# Patient Record
Sex: MALE | Race: WHITE | NOT HISPANIC OR LATINO | Employment: OTHER | URBAN - METROPOLITAN AREA
[De-identification: names, ages, dates, MRNs, and addresses within clinical notes are randomized per-mention and may not be internally consistent; named-entity substitution may affect disease eponyms.]

---

## 2023-06-12 DIAGNOSIS — I25.10 CORONARY ARTERY DISEASE INVOLVING NATIVE CORONARY ARTERY OF NATIVE HEART WITHOUT ANGINA PECTORIS: ICD-10-CM

## 2023-06-12 RX ORDER — CLOPIDOGREL BISULFATE 75 MG/1
TABLET ORAL
Qty: 90 TABLET | Refills: 0 | Status: SHIPPED | OUTPATIENT
Start: 2023-06-12 | End: 2023-09-08 | Stop reason: SDUPTHER

## 2023-08-02 LAB
ANION GAP IN SER/PLAS: 11 MMOL/L (ref 10–20)
CALCIUM (MG/DL) IN SER/PLAS: 9 MG/DL (ref 8.6–10.3)
CARBON DIOXIDE, TOTAL (MMOL/L) IN SER/PLAS: 28 MMOL/L (ref 21–32)
CHLORIDE (MMOL/L) IN SER/PLAS: 108 MMOL/L (ref 98–107)
CREATININE (MG/DL) IN SER/PLAS: 1.19 MG/DL (ref 0.5–1.3)
ERYTHROCYTE DISTRIBUTION WIDTH (RATIO) BY AUTOMATED COUNT: 15.5 % (ref 11.5–14.5)
ERYTHROCYTE MEAN CORPUSCULAR HEMOGLOBIN CONCENTRATION (G/DL) BY AUTOMATED: 31.4 G/DL (ref 32–36)
ERYTHROCYTE MEAN CORPUSCULAR VOLUME (FL) BY AUTOMATED COUNT: 91 FL (ref 80–100)
ERYTHROCYTES (10*6/UL) IN BLOOD BY AUTOMATED COUNT: 4.31 X10E12/L (ref 4.5–5.9)
GFR MALE: 62 ML/MIN/1.73M2
GLUCOSE (MG/DL) IN SER/PLAS: 105 MG/DL (ref 74–99)
HEMATOCRIT (%) IN BLOOD BY AUTOMATED COUNT: 39.2 % (ref 41–52)
HEMOGLOBIN (G/DL) IN BLOOD: 12.3 G/DL (ref 13.5–17.5)
LEUKOCYTES (10*3/UL) IN BLOOD BY AUTOMATED COUNT: 5.1 X10E9/L (ref 4.4–11.3)
MAGNESIUM (MG/DL) IN SER/PLAS: 1.8 MG/DL (ref 1.6–2.4)
NATRIURETIC PEPTIDE B (PG/ML) IN SER/PLAS: 94 PG/ML (ref 0–99)
PLATELETS (10*3/UL) IN BLOOD AUTOMATED COUNT: 155 X10E9/L (ref 150–450)
POTASSIUM (MMOL/L) IN SER/PLAS: 4.8 MMOL/L (ref 3.5–5.3)
SODIUM (MMOL/L) IN SER/PLAS: 142 MMOL/L (ref 136–145)
UREA NITROGEN (MG/DL) IN SER/PLAS: 18 MG/DL (ref 6–23)

## 2023-08-07 ENCOUNTER — APPOINTMENT (OUTPATIENT)
Dept: PRIMARY CARE | Facility: CLINIC | Age: 78
End: 2023-08-07
Payer: MEDICARE

## 2023-08-28 ENCOUNTER — OFFICE VISIT (OUTPATIENT)
Dept: PRIMARY CARE | Facility: CLINIC | Age: 78
End: 2023-08-28
Payer: MEDICARE

## 2023-08-28 VITALS
SYSTOLIC BLOOD PRESSURE: 130 MMHG | WEIGHT: 177.8 LBS | HEART RATE: 51 BPM | TEMPERATURE: 97.9 F | HEIGHT: 70 IN | OXYGEN SATURATION: 96 % | RESPIRATION RATE: 16 BRPM | BODY MASS INDEX: 25.45 KG/M2 | DIASTOLIC BLOOD PRESSURE: 64 MMHG

## 2023-08-28 DIAGNOSIS — I44.1 SECOND DEGREE HEART BLOCK BY ELECTROCARDIOGRAM (ECG): ICD-10-CM

## 2023-08-28 DIAGNOSIS — Z00.00 MEDICARE ANNUAL WELLNESS VISIT, SUBSEQUENT: Primary | ICD-10-CM

## 2023-08-28 DIAGNOSIS — I10 BENIGN ESSENTIAL HTN: ICD-10-CM

## 2023-08-28 DIAGNOSIS — I48.3 TYPICAL ATRIAL FLUTTER (MULTI): ICD-10-CM

## 2023-08-28 DIAGNOSIS — I50.42 CHRONIC COMBINED SYSTOLIC AND DIASTOLIC HEART FAILURE, NYHA CLASS 3 (MULTI): ICD-10-CM

## 2023-08-28 DIAGNOSIS — Z00.00 ROUTINE GENERAL MEDICAL EXAMINATION AT HEALTH CARE FACILITY: ICD-10-CM

## 2023-08-28 DIAGNOSIS — R97.20 ELEVATED PSA: ICD-10-CM

## 2023-08-28 DIAGNOSIS — Z86.73 HISTORY OF CVA (CEREBROVASCULAR ACCIDENT): ICD-10-CM

## 2023-08-28 DIAGNOSIS — R91.1 PULMONARY NODULE: ICD-10-CM

## 2023-08-28 DIAGNOSIS — I25.10 ASHD (ARTERIOSCLEROTIC HEART DISEASE): ICD-10-CM

## 2023-08-28 DIAGNOSIS — K21.9 GASTROESOPHAGEAL REFLUX DISEASE WITHOUT ESOPHAGITIS: ICD-10-CM

## 2023-08-28 DIAGNOSIS — Z95.1 S/P CABG X 2: ICD-10-CM

## 2023-08-28 DIAGNOSIS — D50.0 IRON DEFICIENCY ANEMIA DUE TO CHRONIC BLOOD LOSS: ICD-10-CM

## 2023-08-28 DIAGNOSIS — E78.2 MIXED HYPERLIPIDEMIA: ICD-10-CM

## 2023-08-28 DIAGNOSIS — E11.9 DIET-CONTROLLED DIABETES MELLITUS (MULTI): ICD-10-CM

## 2023-08-28 PROBLEM — Z98.890 HISTORY OF MITRAL VALVE REPAIR: Status: ACTIVE | Noted: 2023-08-28

## 2023-08-28 PROBLEM — R35.1 NOCTURIA: Status: ACTIVE | Noted: 2023-08-28

## 2023-08-28 PROBLEM — E78.5 HYPERLIPIDEMIA: Status: ACTIVE | Noted: 2023-08-28

## 2023-08-28 PROCEDURE — 1126F AMNT PAIN NOTED NONE PRSNT: CPT | Performed by: FAMILY MEDICINE

## 2023-08-28 PROCEDURE — 1170F FXNL STATUS ASSESSED: CPT | Performed by: FAMILY MEDICINE

## 2023-08-28 PROCEDURE — 1036F TOBACCO NON-USER: CPT | Performed by: FAMILY MEDICINE

## 2023-08-28 PROCEDURE — 3075F SYST BP GE 130 - 139MM HG: CPT | Performed by: FAMILY MEDICINE

## 2023-08-28 PROCEDURE — 3078F DIAST BP <80 MM HG: CPT | Performed by: FAMILY MEDICINE

## 2023-08-28 PROCEDURE — 1159F MED LIST DOCD IN RCRD: CPT | Performed by: FAMILY MEDICINE

## 2023-08-28 PROCEDURE — G0439 PPPS, SUBSEQ VISIT: HCPCS | Performed by: FAMILY MEDICINE

## 2023-08-28 PROCEDURE — 1160F RVW MEDS BY RX/DR IN RCRD: CPT | Performed by: FAMILY MEDICINE

## 2023-08-28 RX ORDER — BLOOD SUGAR DIAGNOSTIC
STRIP MISCELLANEOUS
COMMUNITY
Start: 2021-03-08 | End: 2023-08-28 | Stop reason: SDUPTHER

## 2023-08-28 RX ORDER — PANTOPRAZOLE SODIUM 40 MG/1
1 TABLET, DELAYED RELEASE ORAL DAILY
COMMUNITY
Start: 2017-01-25 | End: 2023-08-28 | Stop reason: SDUPTHER

## 2023-08-28 RX ORDER — LANCETS
EACH MISCELLANEOUS
Qty: 100 EACH | Refills: 3 | Status: SHIPPED | OUTPATIENT
Start: 2023-08-28

## 2023-08-28 RX ORDER — BLOOD SUGAR DIAGNOSTIC
STRIP MISCELLANEOUS
Qty: 100 STRIP | Refills: 3 | Status: SHIPPED | OUTPATIENT
Start: 2023-08-28

## 2023-08-28 RX ORDER — LANCETS
EACH MISCELLANEOUS
COMMUNITY
Start: 2023-08-07 | End: 2023-08-28 | Stop reason: SDUPTHER

## 2023-08-28 RX ORDER — PANTOPRAZOLE SODIUM 40 MG/1
40 TABLET, DELAYED RELEASE ORAL DAILY
Qty: 90 TABLET | Refills: 3 | Status: SHIPPED | OUTPATIENT
Start: 2023-08-28 | End: 2024-08-27

## 2023-08-28 RX ORDER — ATORVASTATIN CALCIUM 80 MG/1
80 TABLET, FILM COATED ORAL DAILY
COMMUNITY
Start: 2020-01-07 | End: 2024-01-11 | Stop reason: SDUPTHER

## 2023-08-28 RX ORDER — APIXABAN 5 MG/1
5 TABLET, FILM COATED ORAL 2 TIMES DAILY
COMMUNITY
End: 2024-03-07 | Stop reason: SDUPTHER

## 2023-08-28 RX ORDER — LISINOPRIL 5 MG/1
5 TABLET ORAL DAILY
Qty: 90 TABLET | Refills: 3 | Status: SHIPPED | OUTPATIENT
Start: 2023-08-28 | End: 2024-08-27

## 2023-08-28 RX ORDER — METOPROLOL SUCCINATE 50 MG/1
50 TABLET, EXTENDED RELEASE ORAL NIGHTLY
COMMUNITY
Start: 2019-12-22 | End: 2024-01-29

## 2023-08-28 ASSESSMENT — ACTIVITIES OF DAILY LIVING (ADL)
GROCERY_SHOPPING: INDEPENDENT
DRESSING: INDEPENDENT
TAKING_MEDICATION: INDEPENDENT
DOING_HOUSEWORK: INDEPENDENT
MANAGING_FINANCES: INDEPENDENT
BATHING: INDEPENDENT

## 2023-08-28 ASSESSMENT — LIFESTYLE VARIABLES
HOW OFTEN DO YOU HAVE A DRINK CONTAINING ALCOHOL: NEVER
SKIP TO QUESTIONS 9-10: 1
HOW MANY STANDARD DRINKS CONTAINING ALCOHOL DO YOU HAVE ON A TYPICAL DAY: PATIENT DOES NOT DRINK
AUDIT-C TOTAL SCORE: 0
HOW OFTEN DO YOU HAVE SIX OR MORE DRINKS ON ONE OCCASION: NEVER

## 2023-08-28 ASSESSMENT — ENCOUNTER SYMPTOMS
LOSS OF SENSATION IN FEET: 0
OCCASIONAL FEELINGS OF UNSTEADINESS: 0
DEPRESSION: 0

## 2023-08-28 ASSESSMENT — PATIENT HEALTH QUESTIONNAIRE - PHQ9
2. FEELING DOWN, DEPRESSED OR HOPELESS: NOT AT ALL
1. LITTLE INTEREST OR PLEASURE IN DOING THINGS: NOT AT ALL
SUM OF ALL RESPONSES TO PHQ9 QUESTIONS 1 AND 2: 0

## 2023-08-28 NOTE — PATIENT INSTRUCTIONS
Latest Reference Range & Units 08/02/23 07:58   SODIUM 136 - 145 mmol/L 142   POTASSIUM 3.5 - 5.3 mmol/L 4.8   CHLORIDE 98 - 107 mmol/L 108 (H)   Bicarbonate 21 - 32 mmol/L 28   Blood Urea Nitrogen 6 - 23 mg/dL 18   Creatinine 0.50 - 1.30 mg/dL 1.19   GLUCOSE 74 - 99 mg/dL 105 (H)   Calcium 8.6 - 10.3 mg/dL 9.0   WBC 4.4 - 11.3 x10E9/L 5.1   RBC 4.50 - 5.90 x10E12/L 4.31 (L)   HEMOGLOBIN 13.5 - 17.5 g/dL 12.3 (L)   HEMATOCRIT 41.0 - 52.0 % 39.2 (L)   MCV 80 - 100 fL 91   MCHC 32.0 - 36.0 g/dL 31.4 (L)   RED CELL DISTRIBUTION WIDTH 11.5 - 14.5 % 15.5 (H)   Platelets 150 - 450 x10E9/L 155   (H): Data is abnormally high  (L): Data is abnormally low    Problem List Items Addressed This Visit       Chronic combined systolic and diastolic heart failure, NYHA class 3 (CMS/[facility])    Relevant Medications    metoprolol succinate XL (Toprol-XL) 50 mg 24 hr tablet    Typical atrial flutter (CMS/[facility])    Relevant Medications    metoprolol succinate XL (Toprol-XL) 50 mg 24 hr tablet    Diet-controlled diabetes mellitus (CMS/HCC)    Relevant Medications    Microlet Lancet misc    Contour Next Test Strips strip    Other Relevant Orders    Hemoglobin A1C    ASHD (arteriosclerotic heart disease)    Relevant Medications    metoprolol succinate XL (Toprol-XL) 50 mg 24 hr tablet    Benign essential HTN    Relevant Medications    lisinopril 5 mg tablet    Other Relevant Orders    Comprehensive Metabolic Panel    TSH with reflex to Free T4 if abnormal    Elevated PSA    Relevant Orders    Prostate Specific Antigen    History of CVA (cerebrovascular accident)    Hyperlipidemia    Relevant Orders    Lipid Panel    TSH with reflex to Free T4 if abnormal    Pulmonary nodule    S/P CABG x 2    Second degree heart block by electrocardiogram (ECG)    Medicare annual wellness visit, subsequent - Primary    Iron deficiency anemia due to chronic blood loss    Relevant Orders    CBC and Auto Differential     Other Visit Diagnoses       Routine general  medical examination at health care facility        Gastroesophageal reflux disease without esophagitis        Relevant Medications    pantoprazole (ProtoNix) 40 mg EC tablet

## 2023-08-28 NOTE — PROGRESS NOTES
"Subjective   Reason for Visit: Braden Flores is an 78 y.o. male here for a Medicare Wellness visit.   He has lost five pounds - he is walking and exercising (15 minutes/15 minutes)   Past Medical, Surgical, and Family History reviewed and updated in chart.  Reviewed all medications by prescribing practitioner or clinical pharmacist (such as prescriptions, OTCs, herbal therapies and supplements) and documented in the medical record.    Med Refill    Review of logs brought in by patient show lower BP in afternoon but no symptoms.   Blood pressure has been running lower in the afternoon. Has thing on finger that he wants looked at.   Patient Care Team:  Beth Greer MD as PCP - General  Beth Greer MD as PCP - [facility] Attributed Provider     Review of Systems   All other systems reviewed and are negative.      Objective   Vitals:  /64   Pulse 51   Temp 36.6 °C (97.9 °F) (Temporal)   Resp 16   Ht 1.778 m (5' 10\")   Wt 80.6 kg (177 lb 12.8 oz)   SpO2 96%   BMI 25.51 kg/m²       Physical Exam  Vitals and nursing note reviewed.   Constitutional:       Appearance: Normal appearance.   HENT:      Head: Normocephalic and atraumatic.      Right Ear: Tympanic membrane normal.      Left Ear: Tympanic membrane normal.      Nose: Nose normal.      Mouth/Throat:      Mouth: Mucous membranes are moist.      Pharynx: Oropharynx is clear.   Eyes:      Extraocular Movements: Extraocular movements intact.      Conjunctiva/sclera: Conjunctivae normal.      Pupils: Pupils are equal, round, and reactive to light.   Cardiovascular:      Rate and Rhythm: Normal rate and regular rhythm.      Pulses: Normal pulses.      Heart sounds: Normal heart sounds.      Comments: Regular irregular, like flutter not fib  Pulmonary:      Effort: Pulmonary effort is normal.      Breath sounds: Normal breath sounds.   Abdominal:      General: Abdomen is flat. Bowel sounds are normal.      Palpations: Abdomen is soft. "   Musculoskeletal:         General: Normal range of motion.      Cervical back: Normal range of motion and neck supple.   Skin:     General: Skin is warm and dry.      Capillary Refill: Capillary refill takes less than 2 seconds.   Neurological:      General: No focal deficit present.      Mental Status: He is alert and oriented to person, place, and time.   Psychiatric:         Mood and Affect: Mood normal.         Behavior: Behavior normal.         Assessment/Plan   Problem List Items Addressed This Visit       Chronic combined systolic and diastolic heart failure, NYHA class 3 (CMS/HCC)    Relevant Medications    metoprolol succinate XL (Toprol-XL) 50 mg 24 hr tablet    Typical atrial flutter (CMS/HCC)    Relevant Medications    metoprolol succinate XL (Toprol-XL) 50 mg 24 hr tablet    Diet-controlled diabetes mellitus (CMS/HCC)    Relevant Medications    Microlet Lancet misc    Contour Next Test Strips strip    Other Relevant Orders    Hemoglobin A1C    ASHD (arteriosclerotic heart disease)    Relevant Medications    metoprolol succinate XL (Toprol-XL) 50 mg 24 hr tablet    Benign essential HTN    Relevant Medications    lisinopril 5 mg tablet    Other Relevant Orders    Comprehensive Metabolic Panel    TSH with reflex to Free T4 if abnormal    Elevated PSA    Relevant Orders    Prostate Specific Antigen    History of CVA (cerebrovascular accident)    Hyperlipidemia    Relevant Orders    Lipid Panel    TSH with reflex to Free T4 if abnormal    Pulmonary nodule    S/P CABG x 2    Second degree heart block by electrocardiogram (ECG)    Medicare annual wellness visit, subsequent - Primary    Iron deficiency anemia due to chronic blood loss    Relevant Orders    CBC and Auto Differential     Other Visit Diagnoses       Routine general medical examination at health care facility        Gastroesophageal reflux disease without esophagitis        Relevant Medications    pantoprazole (ProtoNix) 40 mg EC tablet           For blood pressure, after reviewing logs, I decreased lisinopril to 5mg to hopefully get evening blood pressures up a bit.     Call me if blood pressure goes to high.

## 2023-09-08 DIAGNOSIS — I25.10 CORONARY ARTERY DISEASE INVOLVING NATIVE CORONARY ARTERY OF NATIVE HEART WITHOUT ANGINA PECTORIS: ICD-10-CM

## 2023-09-08 RX ORDER — CLOPIDOGREL BISULFATE 75 MG/1
75 TABLET ORAL DAILY
Qty: 90 TABLET | Refills: 3 | Status: SHIPPED | OUTPATIENT
Start: 2023-09-08 | End: 2024-09-07

## 2023-11-07 ENCOUNTER — HOSPITAL ENCOUNTER (OUTPATIENT)
Dept: CARDIOLOGY | Facility: HOSPITAL | Age: 78
Discharge: HOME | End: 2023-11-07
Payer: MEDICARE

## 2023-11-07 DIAGNOSIS — I44.1 ATRIOVENTRICULAR BLOCK, SECOND DEGREE: ICD-10-CM

## 2023-11-07 DIAGNOSIS — Z95.0 PRESENCE OF CARDIAC PACEMAKER: Primary | ICD-10-CM

## 2023-11-07 DIAGNOSIS — Z95.0 PRESENCE OF CARDIAC PACEMAKER: ICD-10-CM

## 2023-11-07 PROCEDURE — 93294 REM INTERROG EVL PM/LDLS PM: CPT | Performed by: INTERNAL MEDICINE

## 2023-11-07 PROCEDURE — 93296 REM INTERROG EVL PM/IDS: CPT

## 2024-01-11 DIAGNOSIS — E78.5 HYPERLIPIDEMIA, UNSPECIFIED HYPERLIPIDEMIA TYPE: Primary | ICD-10-CM

## 2024-01-11 RX ORDER — ATORVASTATIN CALCIUM 80 MG/1
80 TABLET, FILM COATED ORAL DAILY
Qty: 90 TABLET | Refills: 3 | Status: SHIPPED | OUTPATIENT
Start: 2024-01-11

## 2024-02-08 ENCOUNTER — HOSPITAL ENCOUNTER (OUTPATIENT)
Dept: CARDIOLOGY | Facility: HOSPITAL | Age: 79
Discharge: HOME | End: 2024-02-08
Payer: MEDICARE

## 2024-02-08 DIAGNOSIS — I44.1 ATRIOVENTRICULAR BLOCK, SECOND DEGREE: ICD-10-CM

## 2024-02-08 DIAGNOSIS — I44.2 COMPLETE HEART BLOCK (MULTI): ICD-10-CM

## 2024-02-08 DIAGNOSIS — Z95.0 PRESENCE OF CARDIAC PACEMAKER: ICD-10-CM

## 2024-02-08 DIAGNOSIS — Z95.0 PRESENCE OF CARDIAC PACEMAKER: Primary | ICD-10-CM

## 2024-02-08 PROCEDURE — 93280 PM DEVICE PROGR EVAL DUAL: CPT

## 2024-02-08 PROCEDURE — 93280 PM DEVICE PROGR EVAL DUAL: CPT | Performed by: INTERNAL MEDICINE

## 2024-02-14 RX ORDER — DOCUSATE SODIUM 100 MG/1
CAPSULE, LIQUID FILLED ORAL
COMMUNITY
Start: 2019-12-22

## 2024-02-14 RX ORDER — LISINOPRIL 10 MG/1
1 TABLET ORAL DAILY
COMMUNITY
Start: 2019-12-22 | End: 2024-02-14 | Stop reason: DRUGHIGH

## 2024-02-15 PROBLEM — I47.29 NSVT (NONSUSTAINED VENTRICULAR TACHYCARDIA) (MULTI): Status: ACTIVE | Noted: 2024-02-15

## 2024-02-15 PROBLEM — I35.1 NONRHEUMATIC AORTIC VALVE INSUFFICIENCY: Status: ACTIVE | Noted: 2024-02-15

## 2024-02-15 PROBLEM — Z79.01 ON APIXABAN THERAPY: Status: ACTIVE | Noted: 2024-02-15

## 2024-02-15 PROBLEM — I48.91 A-FIB (MULTI): Status: ACTIVE | Noted: 2024-02-15

## 2024-02-15 NOTE — PROGRESS NOTES
[facility]    Patient Name: Braden Flores  Patient : 1945      Scribe Attestation  By signing my name below, I, Puma Carmona   attest that this documentation has been prepared under the direction and in the presence of David Aranda DO.      Reason for visit:  This is an 78-year-old male here for follow-up regarding his history of atrial fibrillation/flutter, anticoagulation with apixaban, coronary artery disease status post bypass done in 2019, mitral valve repair done in 2019, chronic systolic and diastolic heart failure with an ejection fraction of 60%, moderate aortic valve regurgitation, NSVT, hypertension, dyslipidemia, and history of CVA.     HPI:  This is an 78-year-old male here for follow-up regarding his history of atrial fibrillation/flutter, anticoagulation with apixaban, coronary artery disease status post bypass done in 2019, mitral valve repair done in 2019, chronic systolic and diastolic heart failure with an ejection fraction of 60%, moderate aortic valve regurgitation, NSVT, hypertension, dyslipidemia, and history of CVA.  Patient was last evaluated by me in 2023.  At that visit I ordered an echocardiogram, blood work including CMP/lipid/magnesium/CBC to be drawn in 6 months, and asked patient to follow-up in 6 months and sooner if necessary.  Echocardiogram done in 2023 showed normal left ventricular systolic function with an ejection fraction of 60%, normal right ventricular systolic function, mitral valve ring repair with fixed posterior mitral valve leaflet, mild mitral valve regurgitation, mean mitral valve gradient of 2.5 mmHg, mild to moderate aortic valve regurgitation. CMP done on 24 showed normal serum sodium and potassium and a serum creatinine of 1.26. Normal ALT and AST. Hemoglobin A1c done in 2024 was 7.4%. TSH was 0.90. CBC showed a hemoglobin of 11.2.  Lipid panel done in February 2024 showed an LDL cholesterol of 31 and triglycerides of 100 on atorvastatin 80 mg daily. ECG done today showed sinus rhythm with ventricular pacing and a heart rate of 69 bpm. The patient reports that he has been feeling generally well from the cardiac standpoint. He denies any new chest pain, shortness of breath, palpitations and lightheadedness. He states that his blood pressure at home is usually within normal range. He states that he takes all of his medications as prescribed. During my exam, he was resting comfortably on the exam table.            Assessment/Plan:   1. Atrial fibrillation/flutter  The patient has a history of atrial fibrillation/flutter.  He is on apixaban for thromboembolism prophylaxis, which should be continued.  He should continue metoprolol succinate for heart rate control.   ECG done today showed sinus rhythm with ventricular pacing and a heart rate of 69 bpm.   He denies chest pain, palpitations or lightheadedness.   No plans for rhythm control approach at this time.  Echocardiogram done in September 2023 showed normal left ventricular systolic function with an ejection fraction of 60%, normal right ventricular systolic function, mitral valve ring repair with fixed posterior mitral valve leaflet, mild mitral valve regurgitation, mean mitral valve gradient of 2.5 mmHg, mild to moderate aortic valve regurgitation.  Recent lab works as noted in the HPI.   Lab works as noted below will be done in 6 months prior to his next visit.  Follow up in 6 months and sooner if necessary.      2. Anticoagulation with apixaban  The patient is on anticoagulation with apixaban for atrial fibrillation.  Recent lab works as noted in the HPI.   Lab works as noted below will be done in 6 months prior to his next visit.      3. Coronary artery disease  The patient has a history of coronary artery disease status post bypass done in December 2019.  ECG done today showed sinus rhythm  with ventricular pacing and a heart rate of 69 bpm.    He denies any anginal chest discomfort.  Blood pressure appears under controlled on exam today.  He should continue current antihypertensive medications and antiplatelet therapy.  Echocardiogram done in September 2023 showed normal left ventricular systolic function with an ejection fraction of 60%, normal right ventricular systolic function, mitral valve ring repair with fixed posterior mitral valve leaflet, mild mitral valve regurgitation, mean mitral valve gradient of 2.5 mmHg, mild to moderate aortic valve regurgitation.  Lipid panel done in February 2024 showed an LDL cholesterol of 31 and triglycerides of 100 on atorvastatin 80 mg daily.  Recent lab works as noted in the HPI.   Lab works as noted below will be done in 6 months prior to his next visit.   Please see lifestyle recommendations below.  Follow up in 6 months and sooner if necessary.      4. Mitral valve repair  The patient has a history of a mitral valve repair done in December 2019 for severe mitral valve regurgitation.  Echocardiogram done in September 2023 showed normal left ventricular systolic function with an ejection fraction of 60%, normal right ventricular systolic function, mitral valve ring repair with fixed posterior mitral valve leaflet, mild mitral valve regurgitation, mean mitral valve gradient of 2.5 mmHg, mild to moderate aortic valve regurgitation.     5. Chronic systolic and diastolic heart failure  The patient has a history of chronic systolic and diastolic heart failure with an ejection fraction of 60%.  Echocardiogram done in September 2023 showed normal left ventricular systolic function with an ejection fraction of 60%, normal right ventricular systolic function, mitral valve ring repair with fixed posterior mitral valve leaflet, mild mitral valve regurgitation, mean mitral valve gradient of 2.5 mmHg, mild to moderate aortic valve regurgitation.  He does not appear  significantly volume overloaded on exam today.  He should continue his current cardiac medications.  Recent lab works as noted in the HPI.   Lab works as noted below will be done in 6 months prior to his next visit.  I discussed with him the importance of following a low-sodium heart healthy diet.  Follow up in 6 months and sooner if necessary.      6. Aortic valve regurgitation  Echocardiogram done in September 2023 showed normal left ventricular systolic function with an ejection fraction of 60%, normal right ventricular systolic function, mitral valve ring repair with fixed posterior mitral valve leaflet, mild mitral valve regurgitation, mean mitral valve gradient of 2.5 mmHg, mild to moderate aortic valve regurgitation.  I will continue to monitor this clinically for now.     7. NSVT  Patient has a history of NSVT .  ECG done today showed sinus rhythm with ventricular pacing and a heart rate of 69 bpm.   He denies chest pain, palpitations or lightheadedness.   He should continue metoprolol for heart rate control.  Echocardiogram done in September 2023 showed normal left ventricular systolic function with an ejection fraction of 60%, normal right ventricular systolic function, mitral valve ring repair with fixed posterior mitral valve leaflet, mild mitral valve regurgitation, mean mitral valve gradient of 2.5 mmHg, mild to moderate aortic valve regurgitation.  Recent lab works as noted in the HPI.   Lab works as noted below will be done in 6 months prior to his next visit.   Would keep serum potassium greater than 4 and serum magnesium greater than 2.  Patient should follow general recommendations including avoiding excessive alcohol intake, avoiding excessive caffeine intake, staying well-hydrated, getting an appropriate amount of sleep.  Follow up in 6 months and sooner if necessary.      8. Hypertension  Patient has a history of hypertension which appears controlled on exam today.  He should continue his  current antihypertensive medications.      9. Dyslipidemia  Lipid panel done in February 2024 showed an LDL cholesterol of 31 and triglycerides of 100 on atorvastatin 80 mg daily.  Please also see lifestyle recommendations below.     10. History of CVA  Continue risk factor modification.     11. Diabetes Mellitus  Hemoglobin A1c done in February 2024 was 7.4%.  Management as per PCP.      Orders:   BMP/BNP/CBC/Mg in 6 months  Follow-up in 6 months.    Lifestyle Recommendations  I recommend a whole-food plant-based diet, an eating pattern that encourages the consumption of unrefined plant foods (such as fruits, vegetables, tubers, whole grains, legumes, nuts and seeds) and discourages meats, dairy products, eggs and processed foods.     The AHA/ACC recommends that the patient consume a dietary pattern that emphasizes intake of vegetables, fruits, and whole grains; includes low-fat dairy products, poultry, fish, legumes, non-tropical vegetable oils, and nuts; and limits intake of sodium, sweets, sugar-sweetened beverages, and red meats.  Adapt this dietary pattern to appropriate calorie requirements (a 500-750 kcal/day deficit to loose weight), personal and cultural food preferences, and nutrition therapy for other medical conditions (including diabetes).  Achieve this pattern by following plans such as the [place] Mediterranean, DASH dietary pattern, or AHA diet.     Engage in 2 hours and 30 minutes per week of moderate-intensity physical activity, or 1 hour and 15 minutes (75 minutes) per week of vigorous-intensity aerobic physical activity, or an equivalent combination of moderate and vigorous-intensity aerobic physical activity. Aerobic activity should be performed in episodes of at least 10 minutes preferably spread throughout the week.     Adhering to a heart healthy diet, regular exercise habits, avoidance of tobacco products, and maintenance of a healthy weight are crucial components of their heart disease risk  reduction.     Any positive review of systems not specifically addressed in the office visit today should be evaluated and treated by the patients primary care physician or in an emergency department if necessary     Patient was notified that results from ordered tests will be called to the patient if it changes current management; it will otherwise be discussed at a future appointment and available on [facility].     Thank you for allowing me to participate in the care of this patient.        This document was generated using the assistance of voice recognition software. If there are any errors of spelling, grammar, syntax, or meaning; please feel free to contact me directly for clarification.    Past Medical History:  He has a past medical history of Personal history of other diseases of the circulatory system (08/19/2022) and Personal history of transient ischemic attack (TIA), and cerebral infarction without residual deficits.    Past Surgical History:  He has a past surgical history that includes Other surgical history (12/06/2019); Other surgical history (11/25/2019); Other surgical history (11/25/2019); Other surgical history (11/25/2019); Other surgical history (01/07/2020); Other surgical history (01/07/2020); Other surgical history (01/07/2020); MR angio head wo IV contrast (6/25/2015); MR angio neck wo IV contrast (6/25/2015); MR angio head wo IV contrast (11/12/2019); and MR angio neck wo IV contrast (11/12/2019).      Social History:  He reports that he has never smoked. He has been exposed to tobacco smoke. He has never used smokeless tobacco. He reports that he does not drink alcohol and does not use drugs.    Family History:  No family history on file.     Allergies:  Patient has no known allergies.    Outpatient Medications:  Current Outpatient Medications   Medication Instructions    atorvastatin (LIPITOR) 80 mg, oral, Daily    clopidogrel (PLAVIX) 75 mg, oral, Daily    Contour Next Test Strips  "strip CHECK BLOOD GLUCOSE ONCE A DAY    docusate sodium (Colace) 100 mg capsule oral    Eliquis 5 mg, oral, 2 times daily    lisinopril 5 mg, oral, Daily    metoprolol succinate XL (TOPROL-XL) 50 mg, oral, Nightly    Microlet Lancet misc TEST once daily    pantoprazole (PROTONIX) 40 mg, oral, Daily    SPIRONOLACTONE ORAL 0.5 mg, oral, Daily        ROS:  A 14 point review of systems was done and is negative other than as stated in HPI    Vitals:      2/18/2022     9:39 AM 7/22/2022    12:18 PM 8/19/2022     9:15 AM 9/21/2022     2:43 PM 3/8/2023     9:20 AM 8/16/2023     1:54 PM 8/28/2023    12:28 PM   Vitals   Systolic 128 124 126 134 150 110 130   Diastolic 82 84 84 80 87 70 64   Heart Rate  80 73 79 76 78 51   Temp   36.8 °C (98.3 °F)    36.6 °C (97.9 °F)   Resp  17 17    16   Height (in)  1.778 m (5' 10\") 1.778 m (5' 10\") 1.778 m (5' 10\") 1.791 m (5' 10.5\") 1.791 m (5' 10.5\") 1.778 m (5' 10\")   Weight (lb)  179.38 180 181 183 182.5 177.8   BMI  25.74 kg/m2 25.83 kg/m2 25.97 kg/m2 25.89 kg/m2 25.82 kg/m2 25.51 kg/m2   BSA (m2)  2.01 m2 2.01 m2 2.01 m2 2.03 m2 2.03 m2 2 m2        Physical Exam:     Constitutional: Cooperative, in no acute distress, alert, appears stated age.  Skin: Skin color, texture, turgor normal. No rashes or lesions.  Head: Normocephalic. No masses, lesions, tenderness or abnormalities  Eyes: Extraocular movements are grossly intact.  Mouth and throat: Mucous membranes moist  Neck: Neck supple, no carotid bruits, no JVD  Respiratory: Lungs clear to auscultation, no wheezing or rhonchi, no use of accessory muscles  Chest wall: Sternal scar, pacemaker, normal excursion with respiration  Cardiovascular: Regular rhythm with + murmur  Gastrointestinal: Abdomen soft, nontender. Bowel sounds normal.  Musculoskeletal: Strength equal in upper extremities  Extremities: Trace pitting edema  Neurologic: Sensation grossly intact, alert and oriented x3    Intake/Output:   No intake/output data " recorded.    Outpatient Medications  Current Outpatient Medications on File Prior to Visit   Medication Sig Dispense Refill    atorvastatin (Lipitor) 80 mg tablet Take 1 tablet (80 mg) by mouth once daily. 90 tablet 3    clopidogrel (Plavix) 75 mg tablet Take 1 tablet (75 mg) by mouth once daily. 90 tablet 3    Contour Next Test Strips strip CHECK BLOOD GLUCOSE ONCE A  strip 3    docusate sodium (Colace) 100 mg capsule Take by mouth.      Eliquis 5 mg tablet Take 1 tablet (5 mg) by mouth 2 times a day.      lisinopril 5 mg tablet Take 1 tablet (5 mg) by mouth once daily. 90 tablet 3    metoprolol succinate XL (Toprol-XL) 50 mg 24 hr tablet take 1 tablet by mouth at bedtime 90 tablet 0    Microlet Lancet misc TEST once daily 100 each 3    pantoprazole (ProtoNix) 40 mg EC tablet Take 1 tablet (40 mg) by mouth once daily. 90 tablet 3    SPIRONOLACTONE ORAL Take 0.5 mg by mouth once daily.      [DISCONTINUED] lisinopril 10 mg tablet Take 1 tablet (10 mg) by mouth once daily.       No current facility-administered medications on file prior to visit.       Labs: (past 26 weeks)  No results found for this or any previous visit (from the past 4368 hour(s)).    ECG  No results found for this or any previous visit (from the past 4464 hour(s)).    Echocardiogram  No results found for this or any previous visit from the past 1095 days.      CV Studies:  EKG:No results found for this or any previous visit (from the past 4464 hour(s)).  Echocardiogram:   Echocardiogram     [address] [address]  Phone [phone] Fax [phone]    TRANSTHORACIC ECHOCARDIOGRAM REPORT      Patient Name:     TJ Mc Physician:   [id number] David Aranda DO  Study Date:       9/14/2023      Referring Physician: DAVID ARANDA  MRN/PID:          [id number]       PCP:  Accession/Order#: [id number]   Department Location: [facility] Echo Lab  YOB: 1945        Fellow:  Gender:           M              Nurse:               Paul Valerio  Admit Date:                      Sonographer:         Aarbella Howe [facility]  Admission Status: Outpatient     Additional Staff:  Height:           177.80 cm      CC Report to:  Weight:           82.56 kg       Study Type:          Echocardiogram  BSA:              2.01 m2  Blood Pressure: 110 /70 mmHg    Diagnosis/ICD: I35.1-Nonrheumatic aortic (valve) insufficiency;  I25.10-Atherosclerotic heart disease of native coronary artery  without angina pectoris; I48.91-Unspecified atrial fibrillation;  I10-Essential (primary) hypertension; I50.42-Chronic combined  systolic (congestive) and diastolic (congestive) heart failure  (CHF); Z98.890-Other specified postprocedural states  Indication:    AFib, ASHD, CHF, MV Repair, Aortic Reguritation  Procedure/CPT: Echo Complete w Full [id number]    Patient History:  Valve Disorders:   Mitral Valve Repair.  Pertinent History: Murmur, A-Fib, CAD, HTN, CHF, CVA and Previous SVT.    Study Detail: The following Echo studies were performed: 2D, M-Mode, Doppler and  color flow. Technically challenging study due to prominent lung  artifact and body habitus. Optison used as a contrast agent for  endocardial border definition. Total contrast used for this  procedure was 4 mL via IV push.      PHYSICIAN INTERPRETATION:  Left Ventricle: Left ventricular systolic function is normal, with an estimated ejection fraction of 60%. There are no regional wall motion abnormalities. The left ventricular cavity size is normal. The left ventricular septal wall thickness is moderately increased. Left ventricular diastolic filling was indeterminate.  LV Wall Scoring:  The apical septal segment and apical inferior segment are hypokinetic.    Left Atrium: The left atrium is moderately dilated.  Right Ventricle: The right ventricle is upper limits of normal in size. There is normal right ventricular global systolic function. A  device is visualized in the right ventricle.  Right Atrium: The right atrium is normal in size. There is a device visualized in the right atrium.  Aortic Valve: The aortic valve is trileaflet. There is mild to moderate aortic valve regurgitation.  Mitral Valve: The mitral valve is mildly thickened. 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 There is mild mitral valve regurgitation. Mitral valve ring repair with fixed posterior mitral valve leaflet. Mild mitral valve regurgitation with a mean mitral valve gradient of 2.5 mmHg.  Tricuspid Valve: The tricuspid valve is structurally normal. There is trace tricuspid regurgitation.  Pulmonic Valve: The pulmonic valve is structurally normal. There is trace to mild pulmonic valve regurgitation.  Pericardium: There is no pericardial effusion noted.  Aorta: The aortic root is abnormal. There is mild dilatation of the ascending aorta. There is mild dilatation of the aortic root. Based of AD/height < 2.43 cm/m indicated lower risk of dissection.  Systemic Veins: The inferior vena cava size appears small.      CONCLUSIONS:  1. Left ventricular systolic function is normal with a 60% estimated ejection fraction.  2. Apical septal segment and apical inferior segment are abnormal.  3. Moderately increased left ventricular septal thickness.  4. The left atrium is moderately dilated.  5. Mitral valve ring repair with fixed posterior mitral valve leaflet. Mild mitral valve regurgitation with a mean mitral valve gradient of 2.5 mmHg.  6. Mild to moderate aortic valve regurgitation.    QUANTITATIVE DATA SUMMARY:  2D MEASUREMENTS:  Normal Ranges:  Ao Root d:     3.90 cm    (2.0-3.7cm)  LAs:           3.90 cm    (2.7-4.0cm)  IVSd:          1.40 cm    (0.6-1.1cm)  LVPWd:         1.10 cm    (0.6-1.1cm)  LVIDd:         4.70 cm    (3.9-5.9cm)  LVIDs:         2.90 cm  LV Mass Index: 112.1 g/m2  LV % FS        38.3 %    LA VOLUME:  Normal  Ranges:  LA Vol A4C:        83.0 ml    (22+/-6mL/m2)  LA Vol A2C:        64.2 ml  LA Vol BP:         77.0 ml  LA Vol Index A4C:  41.4ml/m2  LA Vol Index A2C:  32.0 ml/m2  LA Vol Index BP:   38.4 ml/m2  LA Area A4C:       24.0 cm2  LA Area A2C:       20.0 cm2  LA Major Axis A4C: 5.9 cm  LA Major Axis A2C: 5.3 cm  LA Volume Index:   38.0 ml/m2    RA VOLUME BY A/L METHOD:  Normal Ranges:  RA Area A4C: 16.0 cm2    M-MODE MEASUREMENTS:  Normal Ranges:  Ao Root: 4.20 cm (2.0-3.7cm)  AoV Exc: 2.60 cm (1.5-2.5cm)  LAs:     4.60 cm (2.7-4.0cm)    AORTA MEASUREMENTS:  Normal Ranges:  AoV Exc:   2.60 cm (1.5-2.5cm)  AoV Sayra,s: 3.90 cm (1.4-2.6cm)  Asc Ao, d: 4.20 cm (2.1-3.4cm)  Ao Arch:   4.30 cm (2.0-3.6cm)    LV SYSTOLIC FUNCTION BY 2D PLANIMETRY (MOD):  Normal Ranges:  EF-A4C View: 66.5 % (>=55%)  EF-A2C View: 70.2 %  EF-Biplane:  68.2 %    LV DIASTOLIC FUNCTION:  Normal Ranges:  MV Peak E:    0.95 m/s    (0.7-1.2 m/s)  MV Peak A:    1.03 m/s    (0.42-0.7 m/s)  E/A Ratio:    0.93        (1.0-2.2)  MV e'         0.06 m/s    (>8.0)  MV lateral e' 0.05 m/s  MV medial e'  0.06 m/s  MV A Dur:     155.00 msec  E/e' Ratio:   17.33       (<8.0)  LV IVRT:      109 msec    (<100ms)    MITRAL VALVE:  Normal Ranges:  MV Vmax:    1.15 m/s (<=1.3m/s)  MV peak P.3 mmHg (<5mmHg)  MV mean P.5 mmHg (<48mmHg)  MV VTI:     39.60 cm (10-13cm)  MV DT:      289 msec (150-240msec)    AORTIC VALVE:  Normal Ranges:  LVOT [address]:  0.90 m/s (<=1.1m/s)  LVOT VTI:      21.00 cm  LVOT Diameter: 2.00 cm  (1.8-2.4cm)    AORTIC INSUFFICIENCY:  AI Vmax:      4.34 m/s  AI Half-time: 578 msec      RIGHT VENTRICLE:  RV Basal 3.70 cm  RV Mid   3.60 cm  RV Major 6.7 cm  TAPSE:   19.0 mm  RV s'    0.08 m/s    TRICUSPID VALVE/RVSP:  Normal Ranges:  Peak TR Velocity: 1.99 m/s  RV Syst Pressure: 18.8 mmHg (< 30mmHg)  TV E Vmax:        0.37 m/s  (0.3-0.7m/s)  TV A Vmax:        0.40 m/s  IVC Diam:         0.70 cm    PULMONIC VALVE:  Normal Ranges:  PV Max  Lane: 1.3 m/s  (0.6-0.9m/s)  PV Max P.4 mmHg  PIEDV:      0.92 m/s  PADP:       6.4 mmHg      [id number] Jaswant Piedra   Electronically signed on 2023 at 10:57:50 AM      Wall Scoring         Final     Stress Testing IMGRESULT([id number]:1:1825): No results found for this or any previous visit from the past 1825 days.    Cardiac Catheterization:   ADULT CATH     Narrative  Ordered by an unspecified provider.  No results found for this or any previous visit from the past 3650 days.     Cardiac Scoring: No results found for this or any previous visit from the past 1825 days.    AAA : No results found for this or any previous visit from the past 1825 days.    OTHER: No results found for this or any previous visit from the past 1825 days.    LAST IMAGING RESULTS  [place] [address]  Phone [phone] Fax [phone]    TRANSTHORACIC ECHOCARDIOGRAM REPORT    Patient Name:     TJ KENNEDY Reading Physician:   [id number] Jaswant Piedra DO  Study Date:       2023      Referring Physician: JASWANT PIEDRA  MRN/PID:          [id number]       PCP:  Accession/Order#: [id number]   Department Location: [facility] Echo Lab  YOB: 1945       Fellow:  Gender:           KARTHIK              Nurse:               Paul Valerio  Admit Date:                      Sonographer:         Arabella Howe [facility]  Admission Status: Outpatient     Additional Staff:  Height:           177.80 cm      CC Report to:  Weight:           82.56 kg       Study Type:          Echocardiogram  BSA:              2.01 m2  Blood Pressure: 110 /70 mmHg    Diagnosis/ICD: I35.1-Nonrheumatic aortic (valve) insufficiency;  I25.10-Atherosclerotic heart disease of native coronary artery  without angina pectoris; I48.91-Unspecified atrial fibrillation;  I10-Essential (primary) hypertension; I50.42-Chronic combined  systolic (congestive) and diastolic (congestive) heart failure  (CHF); Z98.890-Other  specified postprocedural states  Indication:    AFib, ASHD, CHF, MV Repair, Aortic Reguritation  Procedure/CPT: Echo Complete w Full Doppler-[id number]    Patient History:  Valve Disorders:   Mitral Valve Repair.  Pertinent History: Murmur, A-Fib, CAD, HTN, CHF, CVA and Previous SVT.    Study Detail: The following Echo studies were performed: 2D, M-Mode, Doppler and  color flow. Technically challenging study due to prominent lung  artifact and body habitus. Optison used as a contrast agent for  endocardial border definition. Total contrast used for this  procedure was 4 mL via IV push.    PHYSICIAN INTERPRETATION:  Left Ventricle: Left ventricular systolic function is normal, with an estimated ejection fraction of 60%. There are no regional wall motion abnormalities. The left ventricular cavity size is normal. The left ventricular septal wall thickness is moderately increased. Left ventricular diastolic filling was indeterminate.  LV Wall Scoring:  The apical septal segment and apical inferior segment are hypokinetic.    Left Atrium: The left atrium is moderately dilated.  Right Ventricle: The right ventricle is upper limits of normal in size. There is normal right ventricular global systolic function. A device is visualized in the right ventricle.  Right Atrium: The right atrium is normal in size. There is a device visualized in the right atrium.  Aortic Valve: The aortic valve is trileaflet. There is mild to moderate aortic valve regurgitation.  Mitral Valve: The mitral valve is mildly thickened. 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 There is mild mitral valve regurgitation. Mitral valve ring repair with fixed posterior mitral valve leaflet. Mild mitral valve regurgitation with a mean mitral valve gradient of 2.5 mmHg.  Tricuspid Valve: The tricuspid valve is structurally normal. There is trace tricuspid regurgitation.  Pulmonic Valve: The pulmonic valve is  structurally normal. There is trace to mild pulmonic valve regurgitation.  Pericardium: There is no pericardial effusion noted.  Aorta: The aortic root is abnormal. There is mild dilatation of the ascending aorta. There is mild dilatation of the aortic root. Based of AD/height < 2.43 cm/m indicated lower risk of dissection.  Systemic Veins: The inferior vena cava size appears small.    CONCLUSIONS:  1. Left ventricular systolic function is normal with a 60% estimated ejection fraction.  2. Apical septal segment and apical inferior segment are abnormal.  3. Moderately increased left ventricular septal thickness.  4. The left atrium is moderately dilated.  5. Mitral valve ring repair with fixed posterior mitral valve leaflet. Mild mitral valve regurgitation with a mean mitral valve gradient of 2.5 mmHg.  6. Mild to moderate aortic valve regurgitation.    QUANTITATIVE DATA SUMMARY:  2D MEASUREMENTS:  Normal Ranges:  Ao Root d:     3.90 cm    (2.0-3.7cm)  LAs:           3.90 cm    (2.7-4.0cm)  IVSd:          1.40 cm    (0.6-1.1cm)  LVPWd:         1.10 cm    (0.6-1.1cm)  LVIDd:         4.70 cm    (3.9-5.9cm)  LVIDs:         2.90 cm  LV Mass Index: 112.1 g/m2  LV % FS        38.3 %    LA VOLUME:  Normal Ranges:  LA Vol A4C:        83.0 ml    (22+/-6mL/m2)  LA Vol A2C:        64.2 ml  LA Vol BP:         77.0 ml  LA Vol Index A4C:  41.4ml/m2  LA Vol Index A2C:  32.0 ml/m2  LA Vol Index BP:   38.4 ml/m2  LA Area A4C:       24.0 cm2  LA Area A2C:       20.0 cm2  LA Major Axis A4C: 5.9 cm  LA Major Axis A2C: 5.3 cm  LA Volume Index:   38.0 ml/m2    RA VOLUME BY A/L METHOD:  Normal Ranges:  RA Area A4C: 16.0 cm2    M-MODE MEASUREMENTS:  Normal Ranges:  Ao Root: 4.20 cm (2.0-3.7cm)  AoV Exc: 2.60 cm (1.5-2.5cm)  LAs:     4.60 cm (2.7-4.0cm)    AORTA MEASUREMENTS:  Normal Ranges:  AoV Exc:   2.60 cm (1.5-2.5cm)  AoV Sayra,s: 3.90 cm (1.4-2.6cm)  Asc Ao, d: 4.20 cm (2.1-3.4cm)  Ao Arch:   4.30 cm (2.0-3.6cm)    LV SYSTOLIC FUNCTION  BY 2D PLANIMETRY (MOD):  Normal Ranges:  EF-A4C View: 66.5 % (>=55%)  EF-A2C View: 70.2 %  EF-Biplane:  68.2 %    LV DIASTOLIC FUNCTION:  Normal Ranges:  MV Peak E:    0.95 m/s    (0.7-1.2 m/s)  MV Peak A:    1.03 m/s    (0.42-0.7 m/s)  E/A Ratio:    0.93        (1.0-2.2)  MV e'         0.06 m/s    (>8.0)  MV lateral e' 0.05 m/s  MV medial e'  0.06 m/s  MV A Dur:     155.00 msec  E/e' Ratio:   17.33       (<8.0)  LV IVRT:      109 msec    (<100ms)    MITRAL VALVE:  Normal Ranges:  MV Vmax:    1.15 m/s (<=1.3m/s)  MV peak P.3 mmHg (<5mmHg)  MV mean P.5 mmHg (<48mmHg)  MV VTI:     39.60 cm (10-13cm)  MV DT:      289 msec (150-240msec)    AORTIC VALVE:  Normal Ranges:  LVOT [address]:  0.90 m/s (<=1.1m/s)  LVOT VTI:      21.00 cm  LVOT Diameter: 2.00 cm  (1.8-2.4cm)    AORTIC INSUFFICIENCY:  AI Vmax:      4.34 m/s  AI Half-time: 578 msec    RIGHT VENTRICLE:  RV Basal 3.70 cm  RV Mid   3.60 cm  RV Major 6.7 cm  TAPSE:   19.0 mm  RV s'    0.08 m/s    TRICUSPID VALVE/RVSP:  Normal Ranges:  Peak TR Velocity: 1.99 m/s  RV Syst Pressure: 18.8 mmHg (< 30mmHg)  TV E Vmax:        0.37 m/s  (0.3-0.7m/s)  TV A Vmax:        0.40 m/s  IVC Diam:         0.70 cm    PULMONIC VALVE:  Normal Ranges:  PV [address]: 1.3 m/s  (0.6-0.9m/s)  PV Max P.4 mmHg  PIEDV:      0.92 m/s  PADP:       6.4 mmHg    [id number] David Pace DO  Electronically signed on 2023 at 10:57:50 AM    Wall Scoring     Final     Problem List Items Addressed This Visit       Chronic combined systolic and diastolic heart failure, NYHA class 3 (CMS/HCC)    Relevant Orders    ECG 12 Lead (Completed)    Typical atrial flutter (CMS/HCC) - Primary    Relevant Orders    ECG 12 Lead (Completed)    ASHD (arteriosclerotic heart disease)    Relevant Orders    ECG 12 Lead (Completed)    Benign essential HTN    Relevant Orders    ECG 12 Lead (Completed)    Diabetes mellitus type II, non insulin dependent (CMS/HCC)    History of CVA (cerebrovascular accident)    Relevant  Orders    ECG 12 Lead (Completed)    History of mitral valve repair    Relevant Orders    ECG 12 Lead (Completed)    Hyperlipidemia    Relevant Orders    ECG 12 Lead (Completed)    A-fib (CMS/HCC)    Relevant Orders    ECG 12 Lead (Completed)    On apixaban therapy    Relevant Orders    ECG 12 Lead (Completed)    Nonrheumatic aortic valve insufficiency    Relevant Orders    ECG 12 Lead (Completed)    NSVT (nonsustained ventricular tachycardia) (CMS/HCC)    Relevant Orders    ECG 12 Lead (Completed)            David Aranda DO, FACC, FACOI

## 2024-02-16 ENCOUNTER — LAB (OUTPATIENT)
Dept: LAB | Facility: LAB | Age: 79
End: 2024-02-16
Payer: MEDICARE

## 2024-02-16 DIAGNOSIS — D50.0 IRON DEFICIENCY ANEMIA DUE TO CHRONIC BLOOD LOSS: ICD-10-CM

## 2024-02-16 DIAGNOSIS — E11.9 DIET-CONTROLLED DIABETES MELLITUS (MULTI): ICD-10-CM

## 2024-02-16 DIAGNOSIS — R97.20 ELEVATED PSA: ICD-10-CM

## 2024-02-16 DIAGNOSIS — I10 BENIGN ESSENTIAL HTN: ICD-10-CM

## 2024-02-16 DIAGNOSIS — E78.2 MIXED HYPERLIPIDEMIA: ICD-10-CM

## 2024-02-16 LAB
ALBUMIN SERPL BCP-MCNC: 4.1 G/DL (ref 3.4–5)
ALP SERPL-CCNC: 67 U/L (ref 33–136)
ALT SERPL W P-5'-P-CCNC: 17 U/L (ref 10–52)
ANION GAP SERPL CALC-SCNC: 13 MMOL/L (ref 10–20)
AST SERPL W P-5'-P-CCNC: 20 U/L (ref 9–39)
BASOPHILS # BLD AUTO: 0.02 X10*3/UL (ref 0–0.1)
BASOPHILS NFR BLD AUTO: 0.3 %
BILIRUB SERPL-MCNC: 1.2 MG/DL (ref 0–1.2)
BUN SERPL-MCNC: 22 MG/DL (ref 6–23)
CALCIUM SERPL-MCNC: 8.9 MG/DL (ref 8.6–10.3)
CHLORIDE SERPL-SCNC: 106 MMOL/L (ref 98–107)
CHOLEST SERPL-MCNC: 83 MG/DL (ref 0–199)
CHOLESTEROL/HDL RATIO: 2.6
CO2 SERPL-SCNC: 26 MMOL/L (ref 21–32)
CREAT SERPL-MCNC: 1.26 MG/DL (ref 0.5–1.3)
EGFRCR SERPLBLD CKD-EPI 2021: 58 ML/MIN/1.73M*2
EOSINOPHIL # BLD AUTO: 0.09 X10*3/UL (ref 0–0.4)
EOSINOPHIL NFR BLD AUTO: 1.4 %
ERYTHROCYTE [DISTWIDTH] IN BLOOD BY AUTOMATED COUNT: 16.4 % (ref 11.5–14.5)
EST. AVERAGE GLUCOSE BLD GHB EST-MCNC: 166 MG/DL
GLUCOSE SERPL-MCNC: 108 MG/DL (ref 74–99)
HBA1C MFR BLD: 7.4 %
HCT VFR BLD AUTO: 37.7 % (ref 41–52)
HDLC SERPL-MCNC: 32 MG/DL
HGB BLD-MCNC: 11.2 G/DL (ref 13.5–17.5)
IMM GRANULOCYTES # BLD AUTO: 0.02 X10*3/UL (ref 0–0.5)
IMM GRANULOCYTES NFR BLD AUTO: 0.3 % (ref 0–0.9)
LDLC SERPL CALC-MCNC: 31 MG/DL
LYMPHOCYTES # BLD AUTO: 1.12 X10*3/UL (ref 0.8–3)
LYMPHOCYTES NFR BLD AUTO: 17 %
MCH RBC QN AUTO: 25.9 PG (ref 26–34)
MCHC RBC AUTO-ENTMCNC: 29.7 G/DL (ref 32–36)
MCV RBC AUTO: 87 FL (ref 80–100)
MONOCYTES # BLD AUTO: 0.68 X10*3/UL (ref 0.05–0.8)
MONOCYTES NFR BLD AUTO: 10.3 %
NEUTROPHILS # BLD AUTO: 4.67 X10*3/UL (ref 1.6–5.5)
NEUTROPHILS NFR BLD AUTO: 70.7 %
NON HDL CHOLESTEROL: 51 MG/DL (ref 0–149)
NRBC BLD-RTO: 0 /100 WBCS (ref 0–0)
PLATELET # BLD AUTO: 180 X10*3/UL (ref 150–450)
POTASSIUM SERPL-SCNC: 4.7 MMOL/L (ref 3.5–5.3)
PROT SERPL-MCNC: 6.6 G/DL (ref 6.4–8.2)
PSA SERPL-MCNC: 3.53 NG/ML
RBC # BLD AUTO: 4.33 X10*6/UL (ref 4.5–5.9)
SODIUM SERPL-SCNC: 140 MMOL/L (ref 136–145)
TRIGL SERPL-MCNC: 100 MG/DL (ref 0–149)
TSH SERPL-ACNC: 0.9 MIU/L (ref 0.44–3.98)
VLDL: 20 MG/DL (ref 0–40)
WBC # BLD AUTO: 6.6 X10*3/UL (ref 4.4–11.3)

## 2024-02-16 PROCEDURE — 85025 COMPLETE CBC W/AUTO DIFF WBC: CPT

## 2024-02-16 PROCEDURE — 36415 COLL VENOUS BLD VENIPUNCTURE: CPT

## 2024-02-16 PROCEDURE — 84153 ASSAY OF PSA TOTAL: CPT

## 2024-02-16 PROCEDURE — 84443 ASSAY THYROID STIM HORMONE: CPT

## 2024-02-16 PROCEDURE — 83036 HEMOGLOBIN GLYCOSYLATED A1C: CPT

## 2024-02-16 PROCEDURE — 80061 LIPID PANEL: CPT

## 2024-02-16 PROCEDURE — 80053 COMPREHEN METABOLIC PANEL: CPT

## 2024-02-18 NOTE — RESULT ENCOUNTER NOTE
Labwork received and values are essentially normal. Will discuss any variations at upcoming visit.  A1C has gone up and anemia has worsened. Will discuss furthet at visit.

## 2024-02-21 ENCOUNTER — OFFICE VISIT (OUTPATIENT)
Dept: CARDIOLOGY | Facility: CLINIC | Age: 79
End: 2024-02-21
Payer: MEDICARE

## 2024-02-21 VITALS
HEIGHT: 70 IN | BODY MASS INDEX: 25.77 KG/M2 | WEIGHT: 180 LBS | DIASTOLIC BLOOD PRESSURE: 76 MMHG | SYSTOLIC BLOOD PRESSURE: 110 MMHG | HEART RATE: 69 BPM

## 2024-02-21 DIAGNOSIS — I47.29 NSVT (NONSUSTAINED VENTRICULAR TACHYCARDIA) (MULTI): ICD-10-CM

## 2024-02-21 DIAGNOSIS — I48.91 ATRIAL FIBRILLATION, UNSPECIFIED TYPE (MULTI): ICD-10-CM

## 2024-02-21 DIAGNOSIS — I25.10 ASHD (ARTERIOSCLEROTIC HEART DISEASE): ICD-10-CM

## 2024-02-21 DIAGNOSIS — E78.00 PURE HYPERCHOLESTEROLEMIA: ICD-10-CM

## 2024-02-21 DIAGNOSIS — I50.42 CHRONIC COMBINED SYSTOLIC AND DIASTOLIC HEART FAILURE, NYHA CLASS 3 (MULTI): ICD-10-CM

## 2024-02-21 DIAGNOSIS — E11.9 DIABETES MELLITUS TYPE II, NON INSULIN DEPENDENT (MULTI): ICD-10-CM

## 2024-02-21 DIAGNOSIS — I48.3 TYPICAL ATRIAL FLUTTER (MULTI): Primary | ICD-10-CM

## 2024-02-21 DIAGNOSIS — Z98.890 HISTORY OF MITRAL VALVE REPAIR: ICD-10-CM

## 2024-02-21 DIAGNOSIS — I10 BENIGN ESSENTIAL HTN: ICD-10-CM

## 2024-02-21 DIAGNOSIS — I35.1 NONRHEUMATIC AORTIC VALVE INSUFFICIENCY: ICD-10-CM

## 2024-02-21 DIAGNOSIS — Z86.73 HISTORY OF CVA (CEREBROVASCULAR ACCIDENT): ICD-10-CM

## 2024-02-21 DIAGNOSIS — Z79.01 ON APIXABAN THERAPY: ICD-10-CM

## 2024-02-21 PROCEDURE — 93000 ELECTROCARDIOGRAM COMPLETE: CPT | Performed by: INTERNAL MEDICINE

## 2024-02-21 PROCEDURE — 1036F TOBACCO NON-USER: CPT | Performed by: INTERNAL MEDICINE

## 2024-02-21 PROCEDURE — 1126F AMNT PAIN NOTED NONE PRSNT: CPT | Performed by: INTERNAL MEDICINE

## 2024-02-21 PROCEDURE — 3074F SYST BP LT 130 MM HG: CPT | Performed by: INTERNAL MEDICINE

## 2024-02-21 PROCEDURE — 3078F DIAST BP <80 MM HG: CPT | Performed by: INTERNAL MEDICINE

## 2024-02-21 PROCEDURE — 1159F MED LIST DOCD IN RCRD: CPT | Performed by: INTERNAL MEDICINE

## 2024-02-21 PROCEDURE — 99214 OFFICE O/P EST MOD 30 MIN: CPT | Performed by: INTERNAL MEDICINE

## 2024-02-21 RX ORDER — SPIRONOLACTONE 25 MG/1
12.5 TABLET ORAL DAILY
Qty: 45 TABLET | Refills: 3 | Status: SHIPPED | OUTPATIENT
Start: 2024-02-21 | End: 2025-02-20

## 2024-02-27 ENCOUNTER — OFFICE VISIT (OUTPATIENT)
Dept: PRIMARY CARE | Facility: CLINIC | Age: 79
End: 2024-02-27
Payer: MEDICARE

## 2024-02-27 VITALS
DIASTOLIC BLOOD PRESSURE: 80 MMHG | WEIGHT: 188.8 LBS | HEIGHT: 70 IN | HEART RATE: 70 BPM | BODY MASS INDEX: 27.03 KG/M2 | OXYGEN SATURATION: 97 % | RESPIRATION RATE: 18 BRPM | SYSTOLIC BLOOD PRESSURE: 128 MMHG

## 2024-02-27 DIAGNOSIS — N18.31 STAGE 3A CHRONIC KIDNEY DISEASE (MULTI): Primary | ICD-10-CM

## 2024-02-27 DIAGNOSIS — I25.10 ASHD (ARTERIOSCLEROTIC HEART DISEASE): ICD-10-CM

## 2024-02-27 DIAGNOSIS — R97.20 ELEVATED PSA: ICD-10-CM

## 2024-02-27 DIAGNOSIS — D50.0 IRON DEFICIENCY ANEMIA DUE TO CHRONIC BLOOD LOSS: ICD-10-CM

## 2024-02-27 DIAGNOSIS — R35.1 NOCTURIA: ICD-10-CM

## 2024-02-27 DIAGNOSIS — I10 BENIGN ESSENTIAL HTN: ICD-10-CM

## 2024-02-27 DIAGNOSIS — R91.1 PULMONARY NODULE: ICD-10-CM

## 2024-02-27 DIAGNOSIS — I50.42 CHRONIC COMBINED SYSTOLIC AND DIASTOLIC HEART FAILURE, NYHA CLASS 3 (MULTI): ICD-10-CM

## 2024-02-27 DIAGNOSIS — E78.2 MIXED HYPERLIPIDEMIA: ICD-10-CM

## 2024-02-27 DIAGNOSIS — E11.9 DIET-CONTROLLED DIABETES MELLITUS (MULTI): ICD-10-CM

## 2024-02-27 DIAGNOSIS — I48.11 LONGSTANDING PERSISTENT ATRIAL FIBRILLATION (MULTI): ICD-10-CM

## 2024-02-27 PROCEDURE — 99214 OFFICE O/P EST MOD 30 MIN: CPT | Performed by: FAMILY MEDICINE

## 2024-02-27 PROCEDURE — 1159F MED LIST DOCD IN RCRD: CPT | Performed by: FAMILY MEDICINE

## 2024-02-27 PROCEDURE — 1126F AMNT PAIN NOTED NONE PRSNT: CPT | Performed by: FAMILY MEDICINE

## 2024-02-27 PROCEDURE — 3079F DIAST BP 80-89 MM HG: CPT | Performed by: FAMILY MEDICINE

## 2024-02-27 PROCEDURE — 3074F SYST BP LT 130 MM HG: CPT | Performed by: FAMILY MEDICINE

## 2024-02-27 PROCEDURE — 1036F TOBACCO NON-USER: CPT | Performed by: FAMILY MEDICINE

## 2024-02-27 ASSESSMENT — COLUMBIA-SUICIDE SEVERITY RATING SCALE - C-SSRS
1. IN THE PAST MONTH, HAVE YOU WISHED YOU WERE DEAD OR WISHED YOU COULD GO TO SLEEP AND NOT WAKE UP?: NO
6. HAVE YOU EVER DONE ANYTHING, STARTED TO DO ANYTHING, OR PREPARED TO DO ANYTHING TO END YOUR LIFE?: NO
2. HAVE YOU ACTUALLY HAD ANY THOUGHTS OF KILLING YOURSELF?: NO

## 2024-02-27 ASSESSMENT — ENCOUNTER SYMPTOMS
DEPRESSION: 0
LOSS OF SENSATION IN FEET: 0
OCCASIONAL FEELINGS OF UNSTEADINESS: 0

## 2024-02-27 NOTE — PROGRESS NOTES
Subjective   Patient ID: Braden Flores is a 78 y.o. male.    HPI  78 year old male for follow up. He is doing well, continues exercises, and is doing well.   Review of Systems    Objective   Physical Exam  Vitals reviewed.   Constitutional:       Appearance: Normal appearance.   HENT:      Head: Normocephalic and atraumatic.      Right Ear: Tympanic membrane normal.      Left Ear: Tympanic membrane normal.      Nose: Nose normal.      Mouth/Throat:      Mouth: Mucous membranes are moist.      Pharynx: Oropharynx is clear.   Eyes:      Extraocular Movements: Extraocular movements intact.      Conjunctiva/sclera: Conjunctivae normal.      Pupils: Pupils are equal, round, and reactive to light.   Cardiovascular:      Rate and Rhythm: Normal rate and regular rhythm.      Pulses: Normal pulses.      Heart sounds: Normal heart sounds.   Pulmonary:      Effort: Pulmonary effort is normal.      Breath sounds: Normal breath sounds.   Abdominal:      General: Abdomen is flat. Bowel sounds are normal.      Palpations: Abdomen is soft.   Musculoskeletal:         General: Normal range of motion.      Cervical back: Normal range of motion and neck supple.   Skin:     General: Skin is warm and dry.      Capillary Refill: Capillary refill takes less than 2 seconds.   Neurological:      General: No focal deficit present.      Mental Status: He is alert and oriented to person, place, and time.   Psychiatric:         Mood and Affect: Mood normal.         Behavior: Behavior normal.         Assessment/Plan   Diagnoses and all orders for this visit:  Stage 3a chronic kidney disease (CMS/HCC)  Longstanding persistent atrial fibrillation (CMS/HCC)  Benign essential HTN  ASHD (arteriosclerotic heart disease)  Chronic combined systolic and diastolic heart failure, NYHA class 3 (CMS/HCC)  Mixed hyperlipidemia  Diet-controlled diabetes mellitus (CMS/HCC)  -     Hemoglobin A1C; Future  Elevated PSA  Nocturia  Iron deficiency anemia due to  chronic blood loss  -     CBC and Auto Differential; Future  -     Iron and TIBC; Future  Pulmonary nodule  Recheck six months- recheck bloodwork before.

## 2024-02-27 NOTE — PATIENT INSTRUCTIONS
Assessment/Plan   Diagnoses and all orders for this visit:  Stage 3a chronic kidney disease (CMS/HCC)  Longstanding persistent atrial fibrillation (CMS/HCC)  Benign essential HTN  ASHD (arteriosclerotic heart disease)  Chronic combined systolic and diastolic heart failure, NYHA class 3 (CMS/HCC)  Mixed hyperlipidemia  Diet-controlled diabetes mellitus (CMS/HCC)  -     Hemoglobin A1C; Future (7..4)   Elevated PSA  Nocturia- normal PSA this time, That is great.   Iron deficiency anemia due to chronic blood loss  -     CBC and Auto Differential; Future  -     Iron and TIBC; Future  Pulmonary nodule  Recheck six months- recheck bloodowrk before.      Latest Reference Range & Units 02/16/24 09:26   SODIUM 136 - 145 mmol/L 140   POTASSIUM 3.5 - 5.3 mmol/L 4.7   CHLORIDE 98 - 107 mmol/L 106   Bicarbonate 21 - 32 mmol/L 26   Blood Urea Nitrogen 6 - 23 mg/dL 22   Creatinine 0.50 - 1.30 mg/dL 1.26   GLUCOSE 74 - 99 mg/dL 108 (H)   Calcium 8.6 - 10.3 mg/dL 8.9   Hemoglobin A1C see below % 7.4 (H)   Thyroid Stimulating Hormone 0.44 - 3.98 mIU/L 0.90   Alkaline Phosphatase 33 - 136 U/L 67   Albumin 3.4 - 5.0 g/dL 4.1   Total Protein 6.4 - 8.2 g/dL 6.6   AST 9 - 39 U/L 20   ALT 10 - 52 U/L 17 [1]   Bilirubin Total 0.0 - 1.2 mg/dL 1.2   PSA <=4.00 ng/mL 3.53   WBC 4.4 - 11.3 x10*3/uL 6.6   RBC 4.50 - 5.90 x10*6/uL 4.33 (L)   HEMOGLOBIN 13.5 - 17.5 g/dL 11.2 (L)   HEMATOCRIT 41.0 - 52.0 % 37.7 (L)   MCV 80 - 100 fL 87   MCH 26.0 - 34.0 pg 25.9 (L)   MCHC 32.0 - 36.0 g/dL 29.7 (L)   RED CELL DISTRIBUTION WIDTH 11.5 - 14.5 % 16.4 (H)   Platelets 150 - 450 x10*3/uL 180   Neutrophils Absolute 1.60 - 5.50 x10*3/uL 4.67 [2]   Neutrophils % 40.0 - 80.0 % 70.7   Lymphocytes Absolute 0.80 - 3.00 x10*3/uL 1.12   Lymphocytes % 13.0 - 44.0 % 17.0   Monocytes Absolute 0.05 - 0.80 x10*3/uL 0.68   Monocytes % 2.0 - 10.0 % 10.3   Eosinophils Absolute 0.00 - 0.40 x10*3/uL 0.09   Eosinophils % 0.0 - 6.0 % 1.4   Basophils Absolute 0.00 - 0.10  x10*3/uL 0.02   Basophils % 0.0 - 2.0 % 0.3   CHOLESTEROL 0 - 199 mg/dL 83 [3]   HDL CHOLESTEROL mg/dL 32.0 [4]   Cholesterol/HDL Ratio  2.6 [5]   LDL Calculated <=99 mg/dL 31 [6]   VLDL 0 - 40 mg/dL 20   TRIGLYCERIDES 0 - 149 mg/dL 100 [7]   (H): Data is abnormally high  (L): Data is abnormally low  [1] Patients treated with Sulfasalazine may generate falsely decreased results for ALT.  [2] Percent differential counts (%) should be interpreted in the context of the absolute cell counts (cells/uL).  [3]       Age      Desirable   Borderline High   High     0-19 Y     0 - 169       170 - 199     >/= 200    20-24 Y     0 - 189       190 - 224     >/= 225         >24 Y     0 - 199       200 - 239     >/= 240   **All ranges are based on fasting samples. Specific   therapeutic targets will vary based on patient-specific   cardiac risk.    Pediatric guidelines reference:Pediatrics 2011, 128(S5).Adult guidelines reference: NCEP ATPIII Guidelines,EDUARDA 2001, 258:2486-97    Venipuncture immediately after or during the administration of Metamizole may lead to falsely low results. Testing should be performed immediately prior to Metamizole dosing.  [4]   Age       Very Low   Low     Normal    High    0-19 Y    < 35      < 40     40-45     ----  20-24 Y    ----     < 40      >45      ----        >24 Y      ----     < 40     40-60      >60    [5]   Ref Values  Desirable  < 3.4  High Risk  > 5.0  [6]                             Near   Borderline      AGE      Desirable  Optimal    High     High     Very High     0-19 Y     0 - 109     ---    110-129   >/= 130     ----    20-24 Y     0 - 119     ---    120-159   >/= 160     ----      >24 Y     0 -  99   100-129  130-159   160-189     >/=190    [7]    Age         Desirable   Borderline High   High     Very High   0 D-90 D    19 - 174         ----         ----        ----  91 D- 9 Y     0 -  74        75 -  99     >/= 100      ----    10-19 Y     0 -  89        90 - 129     >/= 130       ----    20-24 Y     0 - 114       115 - 149     >/= 150      ----         >24 Y     0 - 149       150 - 199    200- 499    >/= 500    Venipuncture immediately after or during the administration of Metamizole may lead to falsely low results. Testing should be performed immediately prior to Metamizole dosing.

## 2024-02-28 ENCOUNTER — APPOINTMENT (OUTPATIENT)
Dept: PRIMARY CARE | Facility: CLINIC | Age: 79
End: 2024-02-28
Payer: MEDICARE

## 2024-03-07 DIAGNOSIS — I48.91 ATRIAL FIBRILLATION, UNSPECIFIED TYPE (MULTI): Primary | ICD-10-CM

## 2024-05-09 ENCOUNTER — APPOINTMENT (OUTPATIENT)
Dept: CARDIOLOGY | Facility: HOSPITAL | Age: 79
End: 2024-05-09
Payer: MEDICARE

## 2024-05-15 ENCOUNTER — APPOINTMENT (OUTPATIENT)
Dept: CARDIOLOGY | Facility: HOSPITAL | Age: 79
End: 2024-05-15
Payer: MEDICARE

## 2024-05-15 ENCOUNTER — HOSPITAL ENCOUNTER (OUTPATIENT)
Dept: CARDIOLOGY | Facility: HOSPITAL | Age: 79
Discharge: HOME | End: 2024-05-15
Payer: MEDICARE

## 2024-05-15 DIAGNOSIS — Z95.0 PRESENCE OF CARDIAC PACEMAKER: ICD-10-CM

## 2024-05-15 DIAGNOSIS — I44.2 COMPLETE HEART BLOCK (MULTI): ICD-10-CM

## 2024-05-16 ENCOUNTER — HOSPITAL ENCOUNTER (OUTPATIENT)
Dept: CARDIOLOGY | Facility: HOSPITAL | Age: 79
Discharge: HOME | End: 2024-05-16
Payer: MEDICARE

## 2024-05-16 DIAGNOSIS — I44.2 COMPLETE HEART BLOCK (MULTI): Primary | ICD-10-CM

## 2024-05-16 PROCEDURE — 93296 REM INTERROG EVL PM/IDS: CPT

## 2024-05-29 ENCOUNTER — APPOINTMENT (OUTPATIENT)
Dept: CARDIOLOGY | Facility: HOSPITAL | Age: 79
End: 2024-05-29
Payer: MEDICARE

## 2024-07-30 DIAGNOSIS — I10 BENIGN ESSENTIAL HTN: ICD-10-CM

## 2024-07-30 RX ORDER — LISINOPRIL 5 MG/1
5 TABLET ORAL DAILY
Qty: 90 TABLET | Refills: 0 | Status: SHIPPED | OUTPATIENT
Start: 2024-07-30 | End: 2024-10-28

## 2024-08-14 ENCOUNTER — LAB (OUTPATIENT)
Dept: LAB | Facility: LAB | Age: 79
End: 2024-08-14
Payer: MEDICARE

## 2024-08-14 DIAGNOSIS — I48.3 TYPICAL ATRIAL FLUTTER (MULTI): ICD-10-CM

## 2024-08-14 DIAGNOSIS — I48.91 ATRIAL FIBRILLATION, UNSPECIFIED TYPE (MULTI): ICD-10-CM

## 2024-08-14 DIAGNOSIS — I50.42 CHRONIC COMBINED SYSTOLIC AND DIASTOLIC HEART FAILURE, NYHA CLASS 3 (MULTI): ICD-10-CM

## 2024-08-14 DIAGNOSIS — Z86.73 HISTORY OF CVA (CEREBROVASCULAR ACCIDENT): ICD-10-CM

## 2024-08-14 DIAGNOSIS — I10 BENIGN ESSENTIAL HTN: ICD-10-CM

## 2024-08-14 DIAGNOSIS — I47.29 NSVT (NONSUSTAINED VENTRICULAR TACHYCARDIA) (MULTI): ICD-10-CM

## 2024-08-14 DIAGNOSIS — I25.10 ASHD (ARTERIOSCLEROTIC HEART DISEASE): ICD-10-CM

## 2024-08-14 DIAGNOSIS — Z98.890 HISTORY OF MITRAL VALVE REPAIR: ICD-10-CM

## 2024-08-14 DIAGNOSIS — E11.9 DIET-CONTROLLED DIABETES MELLITUS (MULTI): ICD-10-CM

## 2024-08-14 DIAGNOSIS — E11.9 DIABETES MELLITUS TYPE II, NON INSULIN DEPENDENT (MULTI): ICD-10-CM

## 2024-08-14 DIAGNOSIS — D50.0 IRON DEFICIENCY ANEMIA DUE TO CHRONIC BLOOD LOSS: ICD-10-CM

## 2024-08-14 DIAGNOSIS — Z79.01 ON APIXABAN THERAPY: ICD-10-CM

## 2024-08-14 DIAGNOSIS — E78.00 PURE HYPERCHOLESTEROLEMIA: ICD-10-CM

## 2024-08-14 DIAGNOSIS — I35.1 NONRHEUMATIC AORTIC VALVE INSUFFICIENCY: ICD-10-CM

## 2024-08-14 LAB
ANION GAP SERPL CALC-SCNC: 12 MMOL/L (ref 10–20)
BASOPHILS # BLD AUTO: 0.03 X10*3/UL (ref 0–0.1)
BASOPHILS NFR BLD AUTO: 0.4 %
BNP SERPL-MCNC: 50 PG/ML (ref 0–99)
BUN SERPL-MCNC: 20 MG/DL (ref 6–23)
CALCIUM SERPL-MCNC: 9.8 MG/DL (ref 8.6–10.3)
CHLORIDE SERPL-SCNC: 104 MMOL/L (ref 98–107)
CO2 SERPL-SCNC: 27 MMOL/L (ref 21–32)
CREAT SERPL-MCNC: 1.38 MG/DL (ref 0.5–1.3)
EGFRCR SERPLBLD CKD-EPI 2021: 51 ML/MIN/1.73M*2
EOSINOPHIL # BLD AUTO: 0.07 X10*3/UL (ref 0–0.4)
EOSINOPHIL NFR BLD AUTO: 1 %
ERYTHROCYTE [DISTWIDTH] IN BLOOD BY AUTOMATED COUNT: 19.7 % (ref 11.5–14.5)
EST. AVERAGE GLUCOSE BLD GHB EST-MCNC: 166 MG/DL
GLUCOSE SERPL-MCNC: 126 MG/DL (ref 74–99)
HBA1C MFR BLD: 7.4 %
HCT VFR BLD AUTO: 38.2 % (ref 41–52)
HGB BLD-MCNC: 11.2 G/DL (ref 13.5–17.5)
IMM GRANULOCYTES # BLD AUTO: 0.03 X10*3/UL (ref 0–0.5)
IMM GRANULOCYTES NFR BLD AUTO: 0.4 % (ref 0–0.9)
IRON SATN MFR SERPL: 8 % (ref 25–45)
IRON SERPL-MCNC: 33 UG/DL (ref 35–150)
LYMPHOCYTES # BLD AUTO: 1.06 X10*3/UL (ref 0.8–3)
LYMPHOCYTES NFR BLD AUTO: 15.6 %
MAGNESIUM SERPL-MCNC: 1.77 MG/DL (ref 1.6–2.4)
MCH RBC QN AUTO: 23.5 PG (ref 26–34)
MCHC RBC AUTO-ENTMCNC: 29.3 G/DL (ref 32–36)
MCV RBC AUTO: 80 FL (ref 80–100)
MONOCYTES # BLD AUTO: 0.77 X10*3/UL (ref 0.05–0.8)
MONOCYTES NFR BLD AUTO: 11.3 %
NEUTROPHILS # BLD AUTO: 4.85 X10*3/UL (ref 1.6–5.5)
NEUTROPHILS NFR BLD AUTO: 71.3 %
NRBC BLD-RTO: 0 /100 WBCS (ref 0–0)
PLATELET # BLD AUTO: 217 X10*3/UL (ref 150–450)
POTASSIUM SERPL-SCNC: 5.2 MMOL/L (ref 3.5–5.3)
RBC # BLD AUTO: 4.76 X10*6/UL (ref 4.5–5.9)
SODIUM SERPL-SCNC: 138 MMOL/L (ref 136–145)
TIBC SERPL-MCNC: 431 UG/DL (ref 240–445)
UIBC SERPL-MCNC: 398 UG/DL (ref 110–370)
WBC # BLD AUTO: 6.8 X10*3/UL (ref 4.4–11.3)

## 2024-08-14 PROCEDURE — 83880 ASSAY OF NATRIURETIC PEPTIDE: CPT

## 2024-08-14 PROCEDURE — 83735 ASSAY OF MAGNESIUM: CPT

## 2024-08-14 PROCEDURE — 80048 BASIC METABOLIC PNL TOTAL CA: CPT

## 2024-08-14 PROCEDURE — 83036 HEMOGLOBIN GLYCOSYLATED A1C: CPT

## 2024-08-14 PROCEDURE — 85025 COMPLETE CBC W/AUTO DIFF WBC: CPT

## 2024-08-14 PROCEDURE — 36415 COLL VENOUS BLD VENIPUNCTURE: CPT

## 2024-08-14 PROCEDURE — 83540 ASSAY OF IRON: CPT

## 2024-08-14 PROCEDURE — 83550 IRON BINDING TEST: CPT

## 2024-08-22 ENCOUNTER — HOSPITAL ENCOUNTER (OUTPATIENT)
Dept: CARDIOLOGY | Facility: HOSPITAL | Age: 79
Discharge: HOME | End: 2024-08-22
Payer: MEDICARE

## 2024-08-22 DIAGNOSIS — I44.2 COMPLETE HEART BLOCK (MULTI): ICD-10-CM

## 2024-08-22 PROBLEM — I50.30 (HFPEF) HEART FAILURE WITH PRESERVED EJECTION FRACTION (MULTI): Status: ACTIVE | Noted: 2024-08-22

## 2024-08-22 PROCEDURE — 93296 REM INTERROG EVL PM/IDS: CPT

## 2024-08-22 NOTE — PROGRESS NOTES
[facility]    Patient Name: Braden Flores  Patient : 1945      Scribe Attestation  By signing my name below, I, Puma Carmona   attest that this documentation has been prepared under the direction and in the presence of David Aranda DO.      Reason for visit:  This is an 79-year-old male here for follow-up regarding atrial fibrillation/flutter, anticoagulation with apixaban, coronary artery disease status post bypass done in 2019, mitral valve repair done in 2019, HFpEF, mild to moderate aortic valve regurgitation, NSVT, hypertension, dyslipidemia, diabetes mellitus, and history of CVA.       HPI:  This is an 79-year-old male here for follow-up regarding atrial fibrillation/flutter, anticoagulation with apixaban, coronary artery disease status post bypass done in 2019, mitral valve repair done in 2019, HFpEF, mild to moderate aortic valve regurgitation, NSVT, hypertension, dyslipidemia, diabetes mellitus, and history of CVA.  Patient was last evaluated by me in 2024.  At that visit I ordered blood work including BMP/BNP/CBC/magnesium to be drawn in 6 months, and asked the patient to follow-up in 6 months and sooner if necessary. BMP done on 24 showed normal serum sodium and serum potassium and a serum creatinine of 1.38.  Serum magnesium was 1.77, BNO was 50. Hemoglobin A1c done in 2024 was 7.4%. CBC showed a hemoglobin of 11.2.  ECG done today showed atrial sensed and ventricularly paced rhythm with a heart rate of 81 bpm.  The patient reports that he has been feeling generally well from the cardiac standpoint. He denies any new chest pain, shortness of breath, and palpitations. He states that he has occasional dizziness when the weather is too warm. He states that he takes all of his medications as prescribed. During my exam, he was resting comfortably on the exam table.             Assessment/Plan:   1. Atrial fibrillation/flutter  The patient has a history of atrial fibrillation/flutter.  He is on apixaban for thromboembolism prophylaxis, which should be continued.  He should continue metoprolol succinate for heart rate control.   ECG done today showed atrial sensed and ventricularly paced rhythm with a heart rate of 81 bpm.    He denies chest pain, palpitations or lightheadedness.   Echocardiogram done in September 2023 showed normal left ventricular systolic function with an ejection fraction of 60%, normal right ventricular systolic function, mitral valve ring repair with fixed posterior mitral valve leaflet, mild mitral valve regurgitation, mean mitral valve gradient of 2.5 mmHg, mild to moderate aortic valve regurgitation.  Recent lab works as noted in the HPI.   Lab works as noted below will be done in 6 months prior to his next visit.  Follow up in 6 months and sooner if necessary.      2. Anticoagulation with apixaban  The patient is on anticoagulation with apixaban for atrial fibrillation.  Recent lab works as noted in the HPI.   Lab works as noted below will be done in 6 months prior to his next visit.      3. Coronary artery disease  The patient has a history of coronary artery disease status post bypass done in December 2019.  ECG done today showed atrial sensed and ventricularly paced rhythm with a heart rate of 81 bpm.    He denies any anginal chest discomfort.  Blood pressure appears under controlled on exam today.  He also states that his blood pressure is lower in the afternoon usually between 90s-100s systolic.  Blood pressure appears controlled on exam today.  Suggested decreasing the dose of lisinopril due to complaints of dizziness during the warm weather, which he declined at this time.  He should continue current antihypertensive medications and antiplatelet therapy.  Echocardiogram done in September 2023 showed normal left ventricular systolic function with an  ejection fraction of 60%, normal right ventricular systolic function, mitral valve ring repair with fixed posterior mitral valve leaflet, mild mitral valve regurgitation, mean mitral valve gradient of 2.5 mmHg, mild to moderate aortic valve regurgitation.  Lipid panel done in February 2024 showed an LDL cholesterol of 31 and triglycerides of 100 on atorvastatin 80 mg daily.  Recent lab works as noted in the HPI.   Lab works as noted below will be done in 6 months prior to his next visit.   Please see lifestyle recommendations below.  Follow up in 6 months and sooner if necessary.      4. Mitral valve repair  The patient has a history of a mitral valve repair done in December 2019 for severe mitral valve regurgitation.  Echocardiogram done in September 2023 showed normal left ventricular systolic function with an ejection fraction of 60%, normal right ventricular systolic function, mitral valve ring repair with fixed posterior mitral valve leaflet, mild mitral valve regurgitation, mean mitral valve gradient of 2.5 mmHg, mild to moderate aortic valve regurgitation.     5. HFpEF  The patient has HFpEF.   Echocardiogram done in September 2023 showed normal left ventricular systolic function with an ejection fraction of 60%, normal right ventricular systolic function, mitral valve ring repair with fixed posterior mitral valve leaflet, mild mitral valve regurgitation, mean mitral valve gradient of 2.5 mmHg, mild to moderate aortic valve regurgitation.  He does not appear significantly volume overloaded on exam today.  He should continue his current cardiac medications.  Recent lab works as noted in the HPI.   Lab works as noted below will be done in 6 months prior to his next visit.  I discussed with him the importance of following a low-sodium heart healthy diet.  Follow up in 6 months and sooner if necessary.      6. Aortic valve regurgitation  Echocardiogram done in September 2023 showed normal left ventricular  systolic function with an ejection fraction of 60%, normal right ventricular systolic function, mitral valve ring repair with fixed posterior mitral valve leaflet, mild mitral valve regurgitation, mean mitral valve gradient of 2.5 mmHg, mild to moderate aortic valve regurgitation.  I will continue to monitor this clinically for now.     7. NSVT  Patient has a history of NSVT .  ECG done today showed atrial sensed and ventricularly paced rhythm with a heart rate of 81 bpm.    He denies chest pain, palpitations or lightheadedness.   He should continue metoprolol for heart rate control.  Echocardiogram done in September 2023 showed normal left ventricular systolic function with an ejection fraction of 60%, normal right ventricular systolic function, mitral valve ring repair with fixed posterior mitral valve leaflet, mild mitral valve regurgitation, mean mitral valve gradient of 2.5 mmHg, mild to moderate aortic valve regurgitation.  Recent lab works as noted in the HPI.   Lab works as noted below will be done in 6 months prior to his next visit.   Would keep serum potassium greater than 4 and serum magnesium greater than 2.  Patient should follow general recommendations including avoiding excessive alcohol intake, avoiding excessive caffeine intake, staying well-hydrated, getting an appropriate amount of sleep.  Follow up in 6 months and sooner if necessary.      8. Hypertension  The patient has a history of hypertension which appears controlled on exam today.  He also states that his blood pressure is lower in the afternoon usually between 90s-100s systolic.  Blood pressure appears controlled on exam today.  Suggested decreasing the dose of lisinopril due to complaints of dizziness during the warm weather, which he declined at this time.  He should continue his current antihypertensive medications and monitor his blood pressure at home.      9. Dyslipidemia  Lipid panel done in February 2024 showed an LDL cholesterol  of 31 and triglycerides of 100 on atorvastatin 80 mg daily.  Lipid panel will be updated in 6 months.  Please also see lifestyle recommendations below.      10. Diabetes Mellitus  Hemoglobin A1c done in August 2024 was 7.4%.  Management as per PCP.    11. History of CVA  Continue risk factor modification.     12. Dizziness  The patient reports dizziness caused by warm temperature.  ECG done today showed atrial sensed and ventricularly paced rhythm with a heart rate of 81 bpm.    He also states that his blood pressure is lower in the afternoon usually between 90s-100s systolic.  Blood pressure appears controlled on exam today.  Suggested decreasing the dose of lisinopril which he declined at this time.  Patient should follow general recommendations including staying well-hydrated, changing the positions slowly, wearing compression stockings as necessary, and routine exercise.          Orders:   Suggested decreasing the dose of lisinopril -patient declined  CMP/lipid/magnesium/CBC in 6 months,   Follow-up in 6 months.      Lifestyle Recommendations  I recommend a whole-food plant-based diet, an eating pattern that encourages the consumption of unrefined plant foods (such as fruits, vegetables, tubers, whole grains, legumes, nuts and seeds) and discourages meats, dairy products, eggs and processed foods.     The AHA/ACC recommends that the patient consume a dietary pattern that emphasizes intake of vegetables, fruits, and whole grains; includes low-fat dairy products, poultry, fish, legumes, non-tropical vegetable oils, and nuts; and limits intake of sodium, sweets, sugar-sweetened beverages, and red meats.  Adapt this dietary pattern to appropriate calorie requirements (a 500-750 kcal/day deficit to loose weight), personal and cultural food preferences, and nutrition therapy for other medical conditions (including diabetes).  Achieve this pattern by following plans such as the [place] Mediterranean, DASH dietary  pattern, or AHA diet.     Engage in 2 hours and 30 minutes per week of moderate-intensity physical activity, or 1 hour and 15 minutes (75 minutes) per week of vigorous-intensity aerobic physical activity, or an equivalent combination of moderate and vigorous-intensity aerobic physical activity. Aerobic activity should be performed in episodes of at least 10 minutes preferably spread throughout the week.     Adhering to a heart healthy diet, regular exercise habits, avoidance of tobacco products, and maintenance of a healthy weight are crucial components of their heart disease risk reduction.     Any positive review of systems not specifically addressed in the office visit today should be evaluated and treated by the patients primary care physician or in an emergency department if necessary     Patient was notified that results from ordered tests will be called to the patient if it changes current management; it will otherwise be discussed at a future appointment and available on  [employer].     Thank you for allowing me to participate in the care of this patient.        This document was generated using the assistance of voice recognition software. If there are any errors of spelling, grammar, syntax, or meaning; please feel free to contact me directly for clarification.    Past Medical History:  He has a past medical history of Personal history of other diseases of the circulatory system (08/19/2022) and Personal history of transient ischemic attack (TIA), and cerebral infarction without residual deficits.    Past Surgical History:  He has a past surgical history that includes Other surgical history (12/06/2019); Other surgical history (11/25/2019); Other surgical history (11/25/2019); Other surgical history (11/25/2019); Other surgical history (01/07/2020); Other surgical history (01/07/2020); Other surgical history (01/07/2020); MR angio head wo IV contrast (6/25/2015); MR angio neck wo IV contrast (6/25/2015);  "MR angio head wo IV contrast (11/12/2019); and MR angio neck wo IV contrast (11/12/2019).      Social History:  He reports that he has never smoked. He has been exposed to tobacco smoke. He has never used smokeless tobacco. He reports that he does not drink alcohol and does not use drugs.    Family History:  No family history on file.     Allergies:  Patient has no known allergies.    Outpatient Medications:  Current Outpatient Medications   Medication Instructions    apixaban (ELIQUIS) 5 mg, oral, 2 times daily    atorvastatin (LIPITOR) 80 mg, oral, Daily    clopidogrel (PLAVIX) 75 mg, oral, Daily    Contour Next Test Strips strip CHECK BLOOD GLUCOSE ONCE A DAY    docusate sodium (Colace) 100 mg capsule oral    lisinopril 5 mg, oral, Daily    lisinopril 5 mg, oral, Daily    metoprolol succinate XL (TOPROL-XL) 50 mg, oral, Nightly    Microlet Lancet misc TEST once daily    pantoprazole (PROTONIX) 40 mg, oral, Daily    spironolactone (ALDACTONE) 12.5 mg, oral, Daily        ROS:  A 14 point review of systems was done and is negative other than as stated in HPI    Vitals:      8/19/2022     9:15 AM 9/21/2022     2:43 PM 3/8/2023     9:20 AM 8/16/2023     1:54 PM 8/28/2023    12:28 PM 2/21/2024    12:27 PM 2/27/2024    10:53 AM   Vitals   Systolic 126 134 150 110 130 110 128   Diastolic 84 80 87 70 64 76 80   Heart Rate 73 79 76 78 51 69 70   Temp 36.8 °C (98.3 °F)    36.6 °C (97.9 °F)     Resp 17    16  18   Height (in) 1.778 m (5' 10\") 1.778 m (5' 10\") 1.791 m (5' 10.5\") 1.791 m (5' 10.5\") 1.778 m (5' 10\") 1.778 m (5' 10\") 1.778 m (5' 10\")   Weight (lb) 180 181 183 182.5 177.8 180 188.8   BMI 25.83 kg/m2 25.97 kg/m2 25.89 kg/m2 25.82 kg/m2 25.51 kg/m2 25.83 kg/m2 27.09 kg/m2   BSA (m2) 2.01 m2 2.01 m2 2.03 m2 2.03 m2 2 m2 2.01 m2 2.06 m2        Physical Exam:   Constitutional: Cooperative, in no acute distress, alert, appears stated age.  Skin: Skin color, texture, turgor normal. No rashes or lesions.  Head: " Normocephalic. No masses, lesions, tenderness or abnormalities  Eyes: Extraocular movements are grossly intact.  Mouth and throat: Mucous membranes moist  Neck: Neck supple, no carotid bruits, no JVD  Respiratory: Lungs clear to auscultation, no wheezing or rhonchi, no use of accessory muscles  Chest wall: Sternal scar, pacemaker, normal excursion with respiration  Cardiovascular: Regular rhythm with + murmur  Gastrointestinal: Abdomen soft, nontender. Bowel sounds normal.  Musculoskeletal: Strength equal in upper extremities  Extremities: No pitting edema  Neurologic: Sensation grossly intact, alert and oriented x3    Intake/Output:   No intake/output data recorded.    Outpatient Medications  Current Outpatient Medications on File Prior to Visit   Medication Sig Dispense Refill    apixaban (Eliquis) 5 mg tablet Take 1 tablet (5 mg) by mouth 2 times a day. 180 tablet 3    atorvastatin (Lipitor) 80 mg tablet Take 1 tablet (80 mg) by mouth once daily. 90 tablet 3    clopidogrel (Plavix) 75 mg tablet Take 1 tablet (75 mg) by mouth once daily. 90 tablet 3    Contour Next Test Strips strip CHECK BLOOD GLUCOSE ONCE A  strip 3    docusate sodium (Colace) 100 mg capsule Take by mouth.      lisinopril 5 mg tablet take 1 tablet by mouth once daily 90 tablet 0    lisinopril 5 mg tablet Take 1 tablet (5 mg) by mouth once daily. 90 tablet 0    metoprolol succinate XL (Toprol-XL) 50 mg 24 hr tablet take 1 tablet by mouth at bedtime 90 tablet 0    Microlet Lancet misc TEST once daily 100 each 3    pantoprazole (ProtoNix) 40 mg EC tablet take 1 tablet by mouth once daily 90 tablet 0    spironolactone (Aldactone) 25 mg tablet Take 0.5 tablets (12.5 mg) by mouth once daily. 45 tablet 3     No current facility-administered medications on file prior to visit.       Labs: (past 26 weeks)  Recent Results (from the past 4368 hour(s))   Basic Metabolic Panel    Collection Time: 08/14/24  9:37 AM   Result Value Ref Range    Glucose  126 (H) 74 - 99 mg/dL    Sodium 138 136 - 145 mmol/L    Potassium 5.2 3.5 - 5.3 mmol/L    Chloride 104 98 - 107 mmol/L    Bicarbonate 27 21 - 32 mmol/L    Anion Gap 12 10 - 20 mmol/L    Urea Nitrogen 20 6 - 23 mg/dL    Creatinine 1.38 (H) 0.50 - 1.30 mg/dL    eGFR 51 (L) >60 mL/min/1.73m*2    Calcium 9.8 8.6 - 10.3 mg/dL   Magnesium    Collection Time: 08/14/24  9:37 AM   Result Value Ref Range    Magnesium 1.77 1.60 - 2.40 mg/dL   B-Type Natriuretic Peptide    Collection Time: 08/14/24  9:37 AM   Result Value Ref Range    BNP 50 0 - 99 pg/mL   CBC and Auto Differential    Collection Time: 08/14/24  9:37 AM   Result Value Ref Range    WBC 6.8 4.4 - 11.3 x10*3/uL    nRBC 0.0 0.0 - 0.0 /100 WBCs    RBC 4.76 4.50 - 5.90 x10*6/uL    Hemoglobin 11.2 (L) 13.5 - 17.5 g/dL    Hematocrit 38.2 (L) 41.0 - 52.0 %    MCV 80 80 - 100 fL    MCH 23.5 (L) 26.0 - 34.0 pg    MCHC 29.3 (L) 32.0 - 36.0 g/dL    RDW 19.7 (H) 11.5 - 14.5 %    Platelets 217 150 - 450 x10*3/uL    Neutrophils % 71.3 40.0 - 80.0 %    Immature Granulocytes %, Automated 0.4 0.0 - 0.9 %    Lymphocytes % 15.6 13.0 - 44.0 %    Monocytes % 11.3 2.0 - 10.0 %    Eosinophils % 1.0 0.0 - 6.0 %    Basophils % 0.4 0.0 - 2.0 %    Neutrophils Absolute 4.85 1.60 - 5.50 x10*3/uL    Immature Granulocytes Absolute, Automated 0.03 0.00 - 0.50 x10*3/uL    Lymphocytes Absolute 1.06 0.80 - 3.00 x10*3/uL    Monocytes Absolute 0.77 0.05 - 0.80 x10*3/uL    Eosinophils Absolute 0.07 0.00 - 0.40 x10*3/uL    Basophils Absolute 0.03 0.00 - 0.10 x10*3/uL   Hemoglobin A1C    Collection Time: 08/14/24  9:37 AM   Result Value Ref Range    Hemoglobin A1C 7.4 (H) see below %    Estimated Average Glucose 166 Not Established mg/dL   Iron and TIBC    Collection Time: 08/14/24  9:37 AM   Result Value Ref Range    Iron 33 (L) 35 - 150 ug/dL    UIBC 398 (H) 110 - 370 ug/dL    TIBC 431 240 - 445 ug/dL    % Saturation 8 (L) 25 - 45 %       ECG  Encounter Date: 02/21/24   ECG 12 Lead    Narrative     Sinus rhythm with ventricular pacing heart rate 69 bpm.       Echocardiogram  No results found for this or any previous visit from the past 1095 days.      CV Studies:  EKG:  Encounter Date: 02/21/24   ECG 12 Lead    Narrative    Sinus rhythm with ventricular pacing heart rate 69 bpm.     Echocardiogram:   Echocardiogram     Narrative  [place] [id number]  Phone [phone] Fax [phone]    TRANSTHORACIC ECHOCARDIOGRAM REPORT      Patient Name:     TJ KENNEDY Reading Physician:   [id number] Jaswant Piedra DO  Study Date:       9/14/2023      Referring Physician: JASWANT PIEDRA  MRN/PID:          [id number]       PCP:  Accession/Order#: [id number]   Department Location: [facility]  YOB: 1945       Fellow:  Gender:           M              Nurse:               Paul Valerio  Admit Date:                      Sonographer:         Arabella Howe [facility]  Admission Status: Outpatient     Additional Staff:  Height:           177.80 cm      CC Report to:  Weight:           82.56 kg       Study Type:          Echocardiogram  BSA:              2.01 m2  Blood Pressure: 110 /70 mmHg    Diagnosis/ICD: I35.1-Nonrheumatic aortic (valve) insufficiency;  I25.10-Atherosclerotic heart disease of native coronary artery  without angina pectoris; I48.91-Unspecified atrial fibrillation;  I10-Essential (primary) hypertension; I50.42-Chronic combined  systolic (congestive) and diastolic (congestive) heart failure  (CHF); Z98.890-Other specified postprocedural states  Indication:    AFib, ASHD, CHF, MV Repair, Aortic Reguritation  Procedure/CPT: Echo Complete w Full [id number]    Patient History:  Valve Disorders:   Mitral Valve Repair.  Pertinent History: Murmur, A-Fib, CAD, HTN, CHF, CVA and Previous SVT.    Study Detail: The following Echo studies were performed: 2D, M-Mode, Doppler and  color flow. Technically challenging study due to prominent lung  artifact and  body habitus. Optison used as a contrast agent for  endocardial border definition. Total contrast used for this  procedure was 4 mL via IV push.      PHYSICIAN INTERPRETATION:  Left Ventricle: Left ventricular systolic function is normal, with an estimated ejection fraction of 60%. There are no regional wall motion abnormalities. The left ventricular cavity size is normal. The left ventricular septal wall thickness is moderately increased. Left ventricular diastolic filling was indeterminate.  LV Wall Scoring:  The apical septal segment and apical inferior segment are hypokinetic.    Left Atrium: The left atrium is moderately dilated.  Right Ventricle: The right ventricle is upper limits of normal in size. There is normal right ventricular global systolic function. A device is visualized in the right ventricle.  Right Atrium: The right atrium is normal in size. There is a device visualized in the right atrium.  Aortic Valve: The aortic valve is trileaflet. There is mild to moderate aortic valve regurgitation.  Mitral Valve: The mitral valve is mildly thickened. 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 There is mild mitral valve regurgitation. Mitral valve ring repair with fixed posterior mitral valve leaflet. Mild mitral valve regurgitation with a mean mitral valve gradient of 2.5 mmHg.  Tricuspid Valve: The tricuspid valve is structurally normal. There is trace tricuspid regurgitation.  Pulmonic Valve: The pulmonic valve is structurally normal. There is trace to mild pulmonic valve regurgitation.  Pericardium: There is no pericardial effusion noted.  Aorta: The aortic root is abnormal. There is mild dilatation of the ascending aorta. There is mild dilatation of the aortic root. Based of AD/height < 2.43 cm/m indicated lower risk of dissection.  Systemic Veins: The inferior vena cava size appears small.      CONCLUSIONS:  1. Left ventricular systolic function is  normal with a 60% estimated ejection fraction.  2. Apical septal segment and apical inferior segment are abnormal.  3. Moderately increased left ventricular septal thickness.  4. The left atrium is moderately dilated.  5. Mitral valve ring repair with fixed posterior mitral valve leaflet. Mild mitral valve regurgitation with a mean mitral valve gradient of 2.5 mmHg.  6. Mild to moderate aortic valve regurgitation.    QUANTITATIVE DATA SUMMARY:  2D MEASUREMENTS:  Normal Ranges:  Ao Root d:     3.90 cm    (2.0-3.7cm)  LAs:           3.90 cm    (2.7-4.0cm)  IVSd:          1.40 cm    (0.6-1.1cm)  LVPWd:         1.10 cm    (0.6-1.1cm)  LVIDd:         4.70 cm    (3.9-5.9cm)  LVIDs:         2.90 cm  LV Mass Index: 112.1 g/m2  LV % FS        38.3 %    LA VOLUME:  Normal Ranges:  LA Vol A4C:        83.0 ml    (22+/-6mL/m2)  LA Vol A2C:        64.2 ml  LA Vol BP:         77.0 ml  LA Vol Index A4C:  41.4ml/m2  LA Vol Index A2C:  32.0 ml/m2  LA Vol Index BP:   38.4 ml/m2  LA Area A4C:       24.0 cm2  LA Area A2C:       20.0 cm2  LA Major Axis A4C: 5.9 cm  LA Major Axis A2C: 5.3 cm  LA Volume Index:   38.0 ml/m2    RA VOLUME BY A/L METHOD:  Normal Ranges:  RA Area A4C: 16.0 cm2    M-MODE MEASUREMENTS:  Normal Ranges:  Ao Root: 4.20 cm (2.0-3.7cm)  AoV Exc: 2.60 cm (1.5-2.5cm)  LAs:     4.60 cm (2.7-4.0cm)    AORTA MEASUREMENTS:  Normal Ranges:  AoV Exc:   2.60 cm (1.5-2.5cm)  AoV Sayra,s: 3.90 cm (1.4-2.6cm)  Asc Ao, d: 4.20 cm (2.1-3.4cm)  Ao Arch:   4.30 cm (2.0-3.6cm)    LV SYSTOLIC FUNCTION BY 2D PLANIMETRY (MOD):  Normal Ranges:  EF-A4C View: 66.5 % (>=55%)  EF-A2C View: 70.2 %  EF-Biplane:  68.2 %    LV DIASTOLIC FUNCTION:  Normal Ranges:  MV Peak E:    0.95 m/s    (0.7-1.2 m/s)  MV Peak A:    1.03 m/s    (0.42-0.7 m/s)  E/A Ratio:    0.93        (1.0-2.2)  MV e'         0.06 m/s    (>8.0)  MV lateral e' 0.05 m/s  MV medial e'  0.06 m/s  MV A Dur:     155.00 msec  E/e' Ratio:   17.33       (<8.0)  LV IVRT:      109 msec     (<100ms)    MITRAL VALVE:  Normal Ranges:  MV Vmax:    1.15 m/s (<=1.3m/s)  MV peak P.3 mmHg (<5mmHg)  MV mean P.5 mmHg (<48mmHg)  MV VTI:     39.60 cm (10-13cm)  MV DT:      289 msec (150-240msec)    AORTIC VALVE:  Normal Ranges:  LVOT [address]:  0.90 m/s (<=1.1m/s)  LVOT VTI:      21.00 cm  LVOT Diameter: 2.00 cm  (1.8-2.4cm)    AORTIC INSUFFICIENCY:  AI Vmax:      4.34 m/s  AI Half-time: 578 msec      RIGHT VENTRICLE:  RV Basal 3.70 cm  RV Mid   3.60 cm  RV Major 6.7 cm  TAPSE:   19.0 mm  RV s'    0.08 m/s    TRICUSPID VALVE/RVSP:  Normal Ranges:  Peak TR Velocity: 1.99 m/s  RV Syst Pressure: 18.8 mmHg (< 30mmHg)  TV E Vmax:        0.37 m/s  (0.3-0.7m/s)  TV A Vmax:        0.40 m/s  IVC Diam:         0.70 cm    PULMONIC VALVE:  Normal Ranges:  PV Max [address]: 1.3 m/s  (0.6-0.9m/s)  PV Max P.4 mmHg  PIEDV:      0.92 m/s  PADP:       6.4 mmHg      [id number] David Aranda DO  Electronically signed on 2023 at 10:57:50 AM      Wall Scoring         Final     Stress Testing IMESULT([id number]:1:): No results found for this or any previous visit from the past  days.    Cardiac Catheterization:   ADULT CATH     Narrative  Ordered by an unspecified provider.  No results found for this or any previous visit from the past 3650 days.     Cardiac Scoring: No results found for this or any previous visit from the past  days.    AAA : No results found for this or any previous visit from the past  days.    OTHER: No results found for this or any previous visit from the past  days.    LAST IMAGING RESULTS  ECG 12 Lead  Sinus rhythm with ventricular pacing heart rate 69 bpm.      Problem List Items Addressed This Visit       Typical atrial flutter (Multi)    ASHD (arteriosclerotic heart disease)    Benign essential HTN    Diabetes mellitus type II, non insulin dependent (Multi)    History of CVA (cerebrovascular accident)    History of mitral valve repair    Hyperlipidemia    A-fib (Multi) - Primary    On  apixaban therapy    Nonrheumatic aortic valve insufficiency    NSVT (nonsustained ventricular tachycardia) (Multi)    (HFpEF) heart failure with preserved ejection fraction (Multi)          David Aranda DO, FACC, FACOI

## 2024-08-23 ENCOUNTER — APPOINTMENT (OUTPATIENT)
Dept: CARDIOLOGY | Facility: CLINIC | Age: 79
End: 2024-08-23
Payer: MEDICARE

## 2024-08-23 VITALS
DIASTOLIC BLOOD PRESSURE: 62 MMHG | HEIGHT: 70 IN | BODY MASS INDEX: 25.68 KG/M2 | HEART RATE: 81 BPM | SYSTOLIC BLOOD PRESSURE: 122 MMHG | WEIGHT: 179.4 LBS

## 2024-08-23 DIAGNOSIS — I35.1 NONRHEUMATIC AORTIC VALVE INSUFFICIENCY: ICD-10-CM

## 2024-08-23 DIAGNOSIS — I25.10 ASHD (ARTERIOSCLEROTIC HEART DISEASE): ICD-10-CM

## 2024-08-23 DIAGNOSIS — I50.32 CHRONIC HEART FAILURE WITH PRESERVED EJECTION FRACTION (MULTI): ICD-10-CM

## 2024-08-23 DIAGNOSIS — E11.9 DIABETES MELLITUS TYPE II, NON INSULIN DEPENDENT (MULTI): ICD-10-CM

## 2024-08-23 DIAGNOSIS — I47.29 NSVT (NONSUSTAINED VENTRICULAR TACHYCARDIA) (MULTI): ICD-10-CM

## 2024-08-23 DIAGNOSIS — I48.91 ATRIAL FIBRILLATION, UNSPECIFIED TYPE (MULTI): Primary | ICD-10-CM

## 2024-08-23 DIAGNOSIS — Z98.890 HISTORY OF MITRAL VALVE REPAIR: ICD-10-CM

## 2024-08-23 DIAGNOSIS — I48.3 TYPICAL ATRIAL FLUTTER (MULTI): ICD-10-CM

## 2024-08-23 DIAGNOSIS — Z86.73 HISTORY OF CVA (CEREBROVASCULAR ACCIDENT): ICD-10-CM

## 2024-08-23 DIAGNOSIS — I10 BENIGN ESSENTIAL HTN: ICD-10-CM

## 2024-08-23 DIAGNOSIS — Z79.01 ON APIXABAN THERAPY: ICD-10-CM

## 2024-08-23 DIAGNOSIS — E78.2 MIXED HYPERLIPIDEMIA: ICD-10-CM

## 2024-08-23 PROCEDURE — 3074F SYST BP LT 130 MM HG: CPT | Performed by: INTERNAL MEDICINE

## 2024-08-23 PROCEDURE — 1036F TOBACCO NON-USER: CPT | Performed by: INTERNAL MEDICINE

## 2024-08-23 PROCEDURE — 99214 OFFICE O/P EST MOD 30 MIN: CPT | Performed by: INTERNAL MEDICINE

## 2024-08-23 PROCEDURE — 3078F DIAST BP <80 MM HG: CPT | Performed by: INTERNAL MEDICINE

## 2024-08-23 PROCEDURE — 93005 ELECTROCARDIOGRAM TRACING: CPT | Performed by: INTERNAL MEDICINE

## 2024-08-23 PROCEDURE — 1159F MED LIST DOCD IN RCRD: CPT | Performed by: INTERNAL MEDICINE

## 2024-08-23 PROCEDURE — 93010 ELECTROCARDIOGRAM REPORT: CPT | Performed by: INTERNAL MEDICINE

## 2024-08-27 ENCOUNTER — APPOINTMENT (OUTPATIENT)
Dept: PRIMARY CARE | Facility: CLINIC | Age: 79
End: 2024-08-27
Payer: MEDICARE

## 2024-08-27 VITALS
SYSTOLIC BLOOD PRESSURE: 110 MMHG | BODY MASS INDEX: 25.68 KG/M2 | HEART RATE: 77 BPM | DIASTOLIC BLOOD PRESSURE: 64 MMHG | OXYGEN SATURATION: 96 % | WEIGHT: 179.4 LBS | HEIGHT: 70 IN

## 2024-08-27 DIAGNOSIS — E78.00 PURE HYPERCHOLESTEROLEMIA: ICD-10-CM

## 2024-08-27 DIAGNOSIS — Z00.00 ROUTINE GENERAL MEDICAL EXAMINATION AT HEALTH CARE FACILITY: Primary | ICD-10-CM

## 2024-08-27 DIAGNOSIS — K21.9 GASTROESOPHAGEAL REFLUX DISEASE WITHOUT ESOPHAGITIS: ICD-10-CM

## 2024-08-27 DIAGNOSIS — I25.10 ASHD (ARTERIOSCLEROTIC HEART DISEASE): ICD-10-CM

## 2024-08-27 DIAGNOSIS — Z86.73 HISTORY OF CVA (CEREBROVASCULAR ACCIDENT): ICD-10-CM

## 2024-08-27 DIAGNOSIS — I47.29 NSVT (NONSUSTAINED VENTRICULAR TACHYCARDIA) (MULTI): ICD-10-CM

## 2024-08-27 DIAGNOSIS — E78.2 MIXED HYPERLIPIDEMIA: ICD-10-CM

## 2024-08-27 DIAGNOSIS — Z79.01 ON APIXABAN THERAPY: ICD-10-CM

## 2024-08-27 DIAGNOSIS — E78.5 HYPERLIPIDEMIA, UNSPECIFIED HYPERLIPIDEMIA TYPE: ICD-10-CM

## 2024-08-27 DIAGNOSIS — R97.20 ELEVATED PSA: ICD-10-CM

## 2024-08-27 DIAGNOSIS — I25.10 CORONARY ARTERY DISEASE INVOLVING NATIVE CORONARY ARTERY OF NATIVE HEART WITHOUT ANGINA PECTORIS: ICD-10-CM

## 2024-08-27 DIAGNOSIS — I50.32 CHRONIC HEART FAILURE WITH PRESERVED EJECTION FRACTION (MULTI): ICD-10-CM

## 2024-08-27 DIAGNOSIS — E11.9 DIET-CONTROLLED DIABETES MELLITUS (MULTI): ICD-10-CM

## 2024-08-27 DIAGNOSIS — I50.42 CHRONIC COMBINED SYSTOLIC AND DIASTOLIC HEART FAILURE, NYHA CLASS 3 (MULTI): ICD-10-CM

## 2024-08-27 DIAGNOSIS — I10 BENIGN ESSENTIAL HTN: ICD-10-CM

## 2024-08-27 DIAGNOSIS — I35.1 NONRHEUMATIC AORTIC VALVE INSUFFICIENCY: ICD-10-CM

## 2024-08-27 DIAGNOSIS — E11.9 DIABETES MELLITUS TYPE II, NON INSULIN DEPENDENT (MULTI): ICD-10-CM

## 2024-08-27 DIAGNOSIS — S20.01XA CONTUSION OF RIGHT BREAST, INITIAL ENCOUNTER: ICD-10-CM

## 2024-08-27 DIAGNOSIS — D50.0 IRON DEFICIENCY ANEMIA DUE TO CHRONIC BLOOD LOSS: ICD-10-CM

## 2024-08-27 DIAGNOSIS — I48.3 TYPICAL ATRIAL FLUTTER (MULTI): ICD-10-CM

## 2024-08-27 DIAGNOSIS — Z98.890 HISTORY OF MITRAL VALVE REPAIR: ICD-10-CM

## 2024-08-27 DIAGNOSIS — I48.91 ATRIAL FIBRILLATION, UNSPECIFIED TYPE (MULTI): ICD-10-CM

## 2024-08-27 DIAGNOSIS — L72.0 EPITHELIAL INCLUSION CYST: ICD-10-CM

## 2024-08-27 PROBLEM — S20.02XA CONTUSION OF LEFT BREAST: Status: ACTIVE | Noted: 2024-08-27

## 2024-08-27 PROCEDURE — G0439 PPPS, SUBSEQ VISIT: HCPCS | Performed by: FAMILY MEDICINE

## 2024-08-27 PROCEDURE — 3074F SYST BP LT 130 MM HG: CPT | Performed by: FAMILY MEDICINE

## 2024-08-27 PROCEDURE — 1036F TOBACCO NON-USER: CPT | Performed by: FAMILY MEDICINE

## 2024-08-27 PROCEDURE — 1170F FXNL STATUS ASSESSED: CPT | Performed by: FAMILY MEDICINE

## 2024-08-27 PROCEDURE — 1123F ACP DISCUSS/DSCN MKR DOCD: CPT | Performed by: FAMILY MEDICINE

## 2024-08-27 PROCEDURE — 3078F DIAST BP <80 MM HG: CPT | Performed by: FAMILY MEDICINE

## 2024-08-27 PROCEDURE — 1159F MED LIST DOCD IN RCRD: CPT | Performed by: FAMILY MEDICINE

## 2024-08-27 RX ORDER — ATORVASTATIN CALCIUM 80 MG/1
80 TABLET, FILM COATED ORAL DAILY
Qty: 90 TABLET | Refills: 3 | Status: SHIPPED | OUTPATIENT
Start: 2024-08-27

## 2024-08-27 RX ORDER — LANCETS
EACH MISCELLANEOUS
Qty: 100 EACH | Refills: 3 | Status: SHIPPED | OUTPATIENT
Start: 2024-08-27

## 2024-08-27 RX ORDER — CLOPIDOGREL BISULFATE 75 MG/1
75 TABLET ORAL DAILY
Qty: 90 TABLET | Refills: 3 | Status: SHIPPED | OUTPATIENT
Start: 2024-08-27 | End: 2025-08-27

## 2024-08-27 RX ORDER — CEPHALEXIN 250 MG/1
250 CAPSULE ORAL 3 TIMES DAILY
Qty: 21 CAPSULE | Refills: 0 | Status: SHIPPED | OUTPATIENT
Start: 2024-08-27 | End: 2024-09-03

## 2024-08-27 RX ORDER — LISINOPRIL 5 MG/1
5 TABLET ORAL DAILY
Qty: 90 TABLET | Refills: 3 | Status: SHIPPED | OUTPATIENT
Start: 2024-08-27 | End: 2025-08-27

## 2024-08-27 RX ORDER — METOPROLOL SUCCINATE 50 MG/1
50 TABLET, EXTENDED RELEASE ORAL NIGHTLY
Qty: 90 TABLET | Refills: 3 | Status: SHIPPED | OUTPATIENT
Start: 2024-08-27

## 2024-08-27 RX ORDER — PANTOPRAZOLE SODIUM 40 MG/1
40 TABLET, DELAYED RELEASE ORAL DAILY
Qty: 90 TABLET | Refills: 3 | Status: SHIPPED | OUTPATIENT
Start: 2024-08-27

## 2024-08-27 RX ORDER — BLOOD SUGAR DIAGNOSTIC
STRIP MISCELLANEOUS
Qty: 100 STRIP | Refills: 3 | Status: SHIPPED | OUTPATIENT
Start: 2024-08-27

## 2024-08-27 RX ORDER — SPIRONOLACTONE 25 MG/1
12.5 TABLET ORAL DAILY
Qty: 45 TABLET | Refills: 3 | Status: SHIPPED | OUTPATIENT
Start: 2024-08-27 | End: 2025-08-27

## 2024-08-27 ASSESSMENT — ENCOUNTER SYMPTOMS
OCCASIONAL FEELINGS OF UNSTEADINESS: 0
DEPRESSION: 0
LOSS OF SENSATION IN FEET: 0
WOUND: 1

## 2024-08-27 ASSESSMENT — ACTIVITIES OF DAILY LIVING (ADL)
BATHING: INDEPENDENT
MANAGING_FINANCES: INDEPENDENT
DRESSING: INDEPENDENT
TAKING_MEDICATION: INDEPENDENT
DOING_HOUSEWORK: INDEPENDENT
GROCERY_SHOPPING: INDEPENDENT

## 2024-08-27 NOTE — PATIENT INSTRUCTIONS
If breast is still swollen or sore then call and will order ultrasound and mammogram  I sent keflex to pharmacy for cyst, to take for five days.   Follow up six months.     I refilled your medications because you had no refills.     Labs reviewed, sugar was the same., lost five pounds. Still anemic, continue iron.     Good to see you.

## 2024-08-27 NOTE — PROGRESS NOTES
"Subjective   Reason for Visit: Braden Flores is an 79 y.o. male here for a Medicare Wellness visit.   He was working outside and feels that he got lesion on chest, and is worried. Last Saturday a broom hit his breast and it is sore. Slightly better. He switched to [facility] as [facility] closed, meds reconciled.   Past Medical, Surgical, and Family History reviewed and updated in chart.    Reviewed all medications by prescribing practitioner or clinical pharmacist (such as prescriptions, OTCs, herbal therapies and supplements) and documented in the medical record.    HPI  Review of blood pressure showed drop in evening 90/60, and gets dizzy. Discussed with cardiology, and he is to stay on medications. Sugar up when he eats cookies.   Patient Care Team:  Beth Greer MD as PCP - General (Family Medicine)  [facility]     Review of Systems   Skin:  Positive for wound.   All other systems reviewed and are negative.      Objective   Vitals:  /64   Pulse 77   Ht 1.778 m (5' 10\")   Wt 81.4 kg (179 lb 6.4 oz)   SpO2 96%   BMI 25.74 kg/m²       Physical Exam  Vitals reviewed.   Constitutional:       Appearance: Normal appearance.   HENT:      Head: Normocephalic and atraumatic.      Right Ear: Tympanic membrane normal.      Left Ear: Tympanic membrane normal.      Nose: Nose normal.      Mouth/Throat:      Mouth: Mucous membranes are moist.      Pharynx: Oropharynx is clear.   Eyes:      Extraocular Movements: Extraocular movements intact.      Conjunctiva/sclera: Conjunctivae normal.      Pupils: Pupils are equal, round, and reactive to light.   Cardiovascular:      Rate and Rhythm: Normal rate and regular rhythm.      Pulses: Normal pulses.      Heart sounds: Normal heart sounds.   Pulmonary:      Effort: Pulmonary effort is normal.      Breath sounds: Normal breath sounds.   Abdominal:      General: Abdomen is flat. Bowel sounds are normal.      Palpations: " Abdomen is soft.   Musculoskeletal:         General: Normal range of motion.      Cervical back: Normal range of motion and neck supple.   Skin:     General: Skin is warm and dry.      Capillary Refill: Capillary refill takes less than 2 seconds.      Findings: Bruising and lesion present.      Comments: Right breast tenderness, but symmetrical when compared to other breast,   Right thigh inclusion cyst, with internal sac poking out, debrided pices of stack with sterile procedure, no longer draining.    Neurological:      General: No focal deficit present.      Mental Status: He is alert and oriented to person, place, and time.   Psychiatric:         Mood and Affect: Mood normal.         Behavior: Behavior normal.         Assessment/Plan   Problem List Items Addressed This Visit    I refilled all medications today as their pharmacy closed.          ICD-10-CM    Chronic combined systolic and diastolic heart failure, NYHA class 3 (Multi) I50.42    Relevant Medications- stable with cardiology     spironolactone (Aldactone) 25 mg tablet    metoprolol succinate XL (Toprol-XL) 50 mg 24 hr tablet    clopidogrel (Plavix) 75 mg tablet    Typical atrial flutter (Multi) I48.3    Relevant Medications    spironolactone (Aldactone) 25 mg tablet    metoprolol succinate XL (Toprol-XL) 50 mg 24 hr tablet    clopidogrel (Plavix) 75 mg tablet    Diet-controlled diabetes mellitus (Multi) E11.9    Relevant Medications- reveiw of sugars looked less than what A1C showed.     Contour Next Test Strips strip    Microlet Lancet misc    Other Relevant Orders    Follow Up In [facility] - PCP - Established    ASHD (arteriosclerotic heart disease) I25.10    Relevant Medications    spironolactone (Aldactone) 25 mg tablet    metoprolol succinate XL (Toprol-XL) 50 mg 24 hr tablet    clopidogrel (Plavix) 75 mg tablet    Benign essential HTN I10    Relevant Medications    spironolactone (Aldactone) 25 mg tablet    metoprolol succinate XL  (Toprol-XL) 50 mg 24 hr tablet    lisinopril 5 mg tablet    Diabetes mellitus type II, non insulin dependent (Multi) E11.9    Relevant Medications    spironolactone (Aldactone) 25 mg tablet    Elevated PSA R97.20    History of CVA (cerebrovascular accident) Z86.73    Stable and controlled          History of mitral valve repair Z98.890    Stable and controlled          Hyperlipidemia E78.5    Relevant Medications        atorvastatin (Lipitor) 80 mg tablet    Other Relevant Orders    Follow Up In [facility] - PCP - Established    Iron deficiency anemia due to chronic blood loss D50.0    A-fib (Multi) I48.91    Relevant Medications    spironolactone (Aldactone) 25 mg tablet    metoprolol succinate XL (Toprol-XL) 50 mg 24 hr tablet    clopidogrel (Plavix) 75 mg tablet    apixaban (Eliquis) 5 mg tablet    On apixaban therapy Z79.01    Relevant Medications    Stabele     Nonrheumatic aortic valve insufficiency I35.1    Relevant Medications    spironolactone (Aldactone) 25 mg tablet    metoprolol succinate XL (Toprol-XL) 50 mg 24 hr tablet    clopidogrel (Plavix) 75 mg tablet    NSVT (nonsustained ventricular tachycardia) (Multi) I47.29    Relevant Medications    spironolactone (Aldactone) 25 mg tablet    metoprolol succinate XL (Toprol-XL) 50 mg 24 hr tablet    clopidogrel (Plavix) 75 mg tablet    (HFpEF) heart failure with preserved ejection fraction (Multi) I50.30    Relevant Medications    metoprolol succinate XL (Toprol-XL) 50 mg 24 hr tablet    clopidogrel (Plavix) 75 mg tablet    Gastroesophageal reflux disease without esophagitis K21.9    Relevant Medications    pantoprazole (ProtoNix) 40 mg EC tablet     Other Visit Diagnoses         Codes    Routine general medical examination at health care facility    -  Primary Z00.00    Relevant Orders    Follow Up In [facility] - PCP - Established    Coronary artery disease involving native coronary artery of native heart without angina pectoris      I25.10    Relevant Medications    metoprolol succinate XL (Toprol-XL) 50 mg 24 hr tablet    clopidogrel (Plavix) 75 mg tablet    Epithelial inclusion cyst     L72.0    Relevant Medications    cephalexin (Keflex) 250 mg capsule    Contusion of right breast, initial encounter     S20.01XA        If breast is still swollen or sore then call and will order ultrasound and mammogram  I sent keflex to pharmacy for cyst, to take for five days.   Follow up six months.

## 2024-11-05 DIAGNOSIS — I10 BENIGN ESSENTIAL HTN: ICD-10-CM

## 2024-11-05 RX ORDER — LISINOPRIL 5 MG/1
5 TABLET ORAL DAILY
Qty: 90 TABLET | Refills: 3 | Status: CANCELLED | OUTPATIENT
Start: 2024-11-05 | End: 2025-11-05

## 2024-11-21 ENCOUNTER — HOSPITAL ENCOUNTER (OUTPATIENT)
Dept: CARDIOLOGY | Facility: HOSPITAL | Age: 79
Discharge: HOME | End: 2024-11-21
Payer: MEDICARE

## 2024-11-21 DIAGNOSIS — I44.2 COMPLETE HEART BLOCK: ICD-10-CM

## 2024-11-21 DIAGNOSIS — I44.2 COMPLETE HEART BLOCK: Primary | ICD-10-CM

## 2024-11-21 PROCEDURE — 93296 REM INTERROG EVL PM/IDS: CPT

## 2024-11-21 PROCEDURE — 93294 REM INTERROG EVL PM/LDLS PM: CPT | Performed by: STUDENT IN AN ORGANIZED HEALTH CARE EDUCATION/TRAINING PROGRAM

## 2025-02-20 LAB
ALBUMIN SERPL-MCNC: 4.4 G/DL (ref 3.6–5.1)
ALP SERPL-CCNC: 67 U/L (ref 35–144)
ALT SERPL-CCNC: 17 U/L (ref 9–46)
ANION GAP SERPL CALCULATED.4IONS-SCNC: 9 MMOL/L (CALC) (ref 7–17)
AST SERPL-CCNC: 19 U/L (ref 10–35)
BILIRUB SERPL-MCNC: 0.9 MG/DL (ref 0.2–1.2)
BUN SERPL-MCNC: 24 MG/DL (ref 7–25)
CALCIUM SERPL-MCNC: 9.2 MG/DL (ref 8.6–10.3)
CHLORIDE SERPL-SCNC: 105 MMOL/L (ref 98–110)
CHOLEST SERPL-MCNC: 88 MG/DL
CHOLEST/HDLC SERPL: 2.7 (CALC)
CO2 SERPL-SCNC: 25 MMOL/L (ref 20–32)
CREAT SERPL-MCNC: 1.2 MG/DL (ref 0.7–1.22)
EGFRCR SERPLBLD CKD-EPI 2021: 61 ML/MIN/1.73M2
ERYTHROCYTE [DISTWIDTH] IN BLOOD BY AUTOMATED COUNT: 17.1 % (ref 11–15)
GLUCOSE SERPL-MCNC: 125 MG/DL (ref 65–99)
HCT VFR BLD AUTO: 35.1 % (ref 38.5–50)
HDLC SERPL-MCNC: 33 MG/DL
HGB BLD-MCNC: 10.5 G/DL (ref 13.2–17.1)
LDLC SERPL CALC-MCNC: 35 MG/DL (CALC)
MAGNESIUM SERPL-MCNC: 1.9 MG/DL (ref 1.5–2.5)
MCH RBC QN AUTO: 23.2 PG (ref 27–33)
MCHC RBC AUTO-ENTMCNC: 29.9 G/DL (ref 32–36)
MCV RBC AUTO: 77.5 FL (ref 80–100)
NONHDLC SERPL-MCNC: 55 MG/DL (CALC)
PLATELET # BLD AUTO: 227 THOUSAND/UL (ref 140–400)
PMV BLD REES-ECKER: 10.9 FL (ref 7.5–12.5)
POTASSIUM SERPL-SCNC: 4.9 MMOL/L (ref 3.5–5.3)
PROT SERPL-MCNC: 6.8 G/DL (ref 6.1–8.1)
RBC # BLD AUTO: 4.53 MILLION/UL (ref 4.2–5.8)
SODIUM SERPL-SCNC: 139 MMOL/L (ref 135–146)
TRIGL SERPL-MCNC: 117 MG/DL
WBC # BLD AUTO: 6.6 THOUSAND/UL (ref 3.8–10.8)

## 2025-02-23 PROBLEM — R42 DIZZINESS: Status: ACTIVE | Noted: 2025-02-23

## 2025-02-23 NOTE — PROGRESS NOTES
[facility]    Patient Name: Braden Flores  Patient : 1945    Scribe Attestation  By signing my name below, Ivis VELAZCO Scribe attest that this documentation has been prepared under the direction and in the presence of David Aranda DO.    Physician Attestation  David VELAZCO DO, personally performed the services described in the documentation as scribed by Ivis Tyler in my presence, and confirm it is both accurate and complete.    Reason for visit:  This is an 79-year-old male here for follow-up regarding atrial fibrillation/flutter, anticoagulation with apixaban, coronary artery disease status post bypass done in 2019, history of mitral valve repair done in  secondary to severe MR, HFpEF, mild to moderate aortic valve regurgitation, NSVT, hypertension, dyslipidemia, diabetes mellitus, history of CVA, and dizziness.     HPI:  This is an 79-year-old male here for follow-up regarding atrial fibrillation/flutter, anticoagulation with apixaban, coronary artery disease status post bypass done in 2019, history of mitral valve repair done in 2019 secondary to severe MR, HFpEF, mild to moderate aortic valve regurgitation, NSVT, hypertension, dyslipidemia, diabetes mellitus, history of CVA, and dizziness.  The patient was last evaluated by me in 2024.  At that visit I suggested decreasing lisinopril given the reported dizziness which the patient declined, ordered blood work including CMP/lipid/magnesium/CBC to be drawn in 6 months, and asked the patient to follow-up in 6 months and sooner if necessary. CMP done 2025 showed normal serum sodium and serum potassium with a serum creatinine of 1.20. Normal ALT and AST. Serum magnesium was 1.9. CBC showed a hemoglobin of 10.5. Lipid panel done in 2025 showed an LDL cholesterol of 35 and triglycerides of 117 on atorvastatin 80 mg daily. Hemoglobin A1c done in 2025  was 8.7%. ECG done today showed atrial sensed and ventricularly paced rhythm with a heart rate if 67 bpm.  The patient reports having intermittent lightheadedness when quickly changing positions. He denies any new chest pain, shortness of breath, and palpitations. He states that he takes all of his medications as prescribed. During my exam, he was resting comfortably on the exam table.            Assessment/Plan:   1. Atrial fibrillation/flutter  The patient has a history of atrial fibrillation/flutter.  He is on apixaban for thromboembolism prophylaxis, which should be continued.  He should continue metoprolol succinate for heart rate control.   ECG done today showed atrial sensed and ventricularly paced rhythm with a heart rate if 67 bpm.   He denies chest pain, palpitations or lightheadedness.   Echocardiogram done in September 2023 showed normal left ventricular systolic function with an ejection fraction of 60%, normal right ventricular systolic function, mitral valve ring repair with fixed posterior mitral valve leaflet, mild mitral valve regurgitation, mean mitral valve gradient of 2.5 mmHg, mild to moderate aortic valve regurgitation.  Recent lab works as noted in the HPI.   Echocardiogram was ordered as noted below.   Lab works as noted below will be done in 6 months prior to his next visit.   Follow up in 6 months and sooner if necessary.      2. Anticoagulation with apixaban  The patient is on anticoagulation with apixaban for atrial fibrillation.  Recent lab works as noted in the HPI.   Lab works as noted below will be done in 6 months prior to his next visit.      3. Coronary artery disease  The patient has a history of coronary artery disease status post bypass done in December 2019.  ECG done today showed atrial sensed and ventricularly paced rhythm with a heart rate if 67 bpm.   He denies any anginal chest discomfort.  Blood pressure appears controlled on exam today.  He should continue current  antihypertensive medications and antiplatelet therapy.  Echocardiogram done in September 2023 showed normal left ventricular systolic function with an ejection fraction of 60%, normal right ventricular systolic function, mitral valve ring repair with fixed posterior mitral valve leaflet, mild mitral valve regurgitation, mean mitral valve gradient of 2.5 mmHg, mild to moderate aortic valve regurgitation.  Recent lab works as noted in the HPI.   Lipid panel done in February 2025 showed an LDL cholesterol of 35 and triglycerides of 117 on atorvastatin 80 mg daily.   Echocardiogram was ordered as noted below.   Lab works as noted below will be done in 6 months prior to his next visit.   Please see lifestyle recommendations below.  Follow up in 6 months and sooner if necessary.      4. Mitral valve repair  The patient has a history of a mitral valve repair done in December 2019 for severe mitral valve regurgitation.  Echocardiogram done in September 2023 showed normal left ventricular systolic function with an ejection fraction of 60%, normal right ventricular systolic function, mitral valve ring repair with fixed posterior mitral valve leaflet, mild mitral valve regurgitation, mean mitral valve gradient of 2.5 mmHg, mild to moderate aortic valve regurgitation.  Echocardiogram was ordered as noted below.      5. HFpEF  The patient has HFpEF.   Echocardiogram done in September 2023 showed normal left ventricular systolic function with an ejection fraction of 60%, normal right ventricular systolic function, mitral valve ring repair with fixed posterior mitral valve leaflet, mild mitral valve regurgitation, mean mitral valve gradient of 2.5 mmHg, mild to moderate aortic valve regurgitation.  He does not appear significantly volume overloaded on exam today.  He should continue his current cardiac medications.  Recent lab works as noted in the HPI.   Echocardiogram was ordered as noted below.  Lab works as noted below will be  done in 6 months prior to his next visit.   I discussed with him the importance of following a low-sodium heart healthy diet.  Follow up in 6 months and sooner if necessary.      6. Aortic valve regurgitation  The patient has a history of mild to moderate aortic valve regurgitation.  Echocardiogram done in September 2023 showed normal left ventricular systolic function with an ejection fraction of 60%, normal right ventricular systolic function, mitral valve ring repair with fixed posterior mitral valve leaflet, mild mitral valve regurgitation, mean mitral valve gradient of 2.5 mmHg, mild to moderate aortic valve regurgitation.  Echocardiogram was ordered as noted below.      7. NSVT  Patient has a history of NSVT .  ECG done today showed atrial sensed and ventricularly paced rhythm with a heart rate if 67 bpm.   He denies chest pain, palpitations or lightheadedness.   He should continue metoprolol for heart rate control.  Echocardiogram done in September 2023 showed normal left ventricular systolic function with an ejection fraction of 60%, normal right ventricular systolic function, mitral valve ring repair with fixed posterior mitral valve leaflet, mild mitral valve regurgitation, mean mitral valve gradient of 2.5 mmHg, mild to moderate aortic valve regurgitation.  Recent lab works as noted in the HPI.   Echocardiogram was ordered as noted below.  Lab works as noted below will be done in 6 months prior to his next visit.   Would keep serum potassium greater than 4 and serum magnesium greater than 2.  Patient should follow general recommendations including avoiding excessive alcohol intake, avoiding excessive caffeine intake, staying well-hydrated, getting an appropriate amount of sleep.  Follow up in 6 months and sooner if necessary.      8. Hypertension  The patient has a history of hypertension which appears controlled on exam today.  He should continue his current antihypertensive medications and monitor his  blood pressure at home.      9. Dyslipidemia  Lipid panel done in February 2025 showed an LDL cholesterol of 35 and triglycerides of 117 on atorvastatin 80 mg daily.   Please see lifestyle recommendations below.      10. Diabetes Mellitus  Hemoglobin A1c done in February 2025 was 8.7%.  Management as per PCP.     11. History of CVA  Continue risk factor modification.     12. Dizziness/lightheadedness  The patient reports lightheadedness when quickly changing positions.  ECG done today showed atrial sensed and ventricularly paced rhythm with a heart rate if 67 bpm.    Blood pressure appears controlled on exam today.  Recent lab works as noted in the HPI.  Echocardiogram was ordered as noted below.  Lab works as noted below will be done in 6 months prior to his next visit.   Patient should follow general recommendations including staying well-hydrated, changing the positions slowly, wearing compression stockings as necessary, and routine exercise.          Orders:   BMP/BNP/CBC/magnesium in 6 months,   Echocardiogram   Follow-up in 6 months        Lifestyle Recommendations  I recommend a whole-food plant-based diet, an eating pattern that encourages the consumption of unrefined plant foods (such as fruits, vegetables, tubers, whole grains, legumes, nuts and seeds) and discourages meats, dairy products, eggs and processed foods.     The AHA/ACC recommends that the patient consume a dietary pattern that emphasizes intake of vegetables, fruits, and whole grains; includes low-fat dairy products, poultry, fish, legumes, non-tropical vegetable oils, and nuts; and limits intake of sodium, sweets, sugar-sweetened beverages, and red meats.  Adapt this dietary pattern to appropriate calorie requirements (a 500-750 kcal/day deficit to loose weight), personal and cultural food preferences, and nutrition therapy for other medical conditions (including diabetes).  Achieve this pattern by following plans such as the [place]  Mediterranean, DASH dietary pattern, or AHA diet.     Engage in 2 hours and 30 minutes per week of moderate-intensity physical activity, or 1 hour and 15 minutes (75 minutes) per week of vigorous-intensity aerobic physical activity, or an equivalent combination of moderate and vigorous-intensity aerobic physical activity. Aerobic activity should be performed in episodes of at least 10 minutes preferably spread throughout the week.     Adhering to a heart healthy diet, regular exercise habits, avoidance of tobacco products, and maintenance of a healthy weight are crucial components of their heart disease risk reduction.     Any positive review of systems not specifically addressed in the office visit today should be evaluated and treated by the patients primary care physician or in an emergency department if necessary     Patient was notified that results from ordered tests will be called to the patient if it changes current management; it will otherwise be discussed at a future appointment and available on  [employer].     Thank you for allowing me to participate in the care of this patient.        This document was generated using the assistance of voice recognition software. If there are any errors of spelling, grammar, syntax, or meaning; please feel free to contact me directly for clarification.    Past Medical History:  He has a past medical history of Personal history of other diseases of the circulatory system (08/19/2022) and Personal history of transient ischemic attack (TIA), and cerebral infarction without residual deficits.    Past Surgical History:  He has a past surgical history that includes Other surgical history (12/06/2019); Other surgical history (11/25/2019); Other surgical history (11/25/2019); Other surgical history (11/25/2019); Other surgical history (01/07/2020); Other surgical history (01/07/2020); Other surgical history (01/07/2020); MR angio head wo IV contrast (6/25/2015); MR angio neck  "wo IV contrast (6/25/2015); MR angio head wo IV contrast (11/12/2019); and MR angio neck wo IV contrast (11/12/2019).      Social History:  He reports that he has never smoked. He has been exposed to tobacco smoke. He has never used smokeless tobacco. He reports that he does not drink alcohol and does not use drugs.    Family History:  No family history on file.     Allergies:  Patient has no known allergies.    Outpatient Medications:  Current Outpatient Medications   Medication Instructions    apixaban (ELIQUIS) 5 mg, oral, 2 times daily    atorvastatin (LIPITOR) 80 mg, oral, Daily    clopidogrel (PLAVIX) 75 mg, oral, Daily    Contour Next Test Strips strip CHECK BLOOD GLUCOSE ONCE A DAY    docusate sodium (Colace) 100 mg capsule oral    lisinopril 5 mg, oral, Daily    metoprolol succinate XL (TOPROL-XL) 50 mg, oral, Nightly    Microlet Lancet misc TEST once daily    pantoprazole (PROTONIX) 40 mg, oral, Daily    spironolactone (ALDACTONE) 12.5 mg, oral, Daily        ROS:  A 14 point review of systems was done and is negative other than as stated in HPI    Vitals:      3/8/2023     9:20 AM 8/16/2023     1:54 PM 8/28/2023    12:28 PM 2/21/2024    12:27 PM 2/27/2024    10:53 AM 8/23/2024    10:59 AM 8/27/2024    10:39 AM   Vitals   Systolic 150 110 130 110 128 122 110   Diastolic 87 70 64 76 80 62 64   BP Location      Left arm    Heart Rate 76 78 51 69 70 81 77   Temp   36.6 °C (97.9 °F)       Resp   16  18     Height 1.791 m (5' 10.5\") 1.791 m (5' 10.5\") 1.778 m (5' 10\") 1.778 m (5' 10\") 1.778 m (5' 10\") 1.778 m (5' 10\") 1.778 m (5' 10\")   Weight (lb) 183 182.5 177.8 180 188.8 179.4 179.4   BMI 25.89 kg/m2 25.82 kg/m2 25.51 kg/m2 25.83 kg/m2 27.09 kg/m2 25.74 kg/m2 25.74 kg/m2   BSA (m2) 2.03 m2 2.03 m2 2 m2 2.01 m2 2.06 m2 2.01 m2 2.01 m2        Physical Exam:   Constitutional: Cooperative, in no acute distress, alert, appears stated age.  Skin: Skin color, texture, turgor normal. No rashes or lesions.  Head: " Normocephalic. No masses, lesions, tenderness or abnormalities  Eyes: Extraocular movements are grossly intact.  Mouth and throat: Mucous membranes moist  Neck: Neck supple, no carotid bruits, no JVD  Respiratory: Lungs clear to auscultation, no wheezing or rhonchi, no use of accessory muscles  Chest wall: Sternal scar, pacemaker, normal excursion with respiration  Cardiovascular: Regular rhythm with + murmur  Gastrointestinal: Abdomen soft, nontender. Bowel sounds normal.  Musculoskeletal: Strength equal in upper extremities  Extremities: No pitting edema  Neurologic: Sensation grossly intact, alert and oriented x3        Intake/Output:   No intake/output data recorded.    Outpatient Medications  Current Outpatient Medications on File Prior to Visit   Medication Sig Dispense Refill    apixaban (Eliquis) 5 mg tablet Take 1 tablet (5 mg) by mouth 2 times a day. 180 tablet 3    atorvastatin (Lipitor) 80 mg tablet Take 1 tablet (80 mg) by mouth once daily. 90 tablet 3    clopidogrel (Plavix) 75 mg tablet Take 1 tablet (75 mg) by mouth once daily. 90 tablet 3    Contour Next Test Strips strip CHECK BLOOD GLUCOSE ONCE A  strip 3    docusate sodium (Colace) 100 mg capsule Take by mouth.      lisinopril 5 mg tablet Take 1 tablet (5 mg) by mouth once daily. 90 tablet 3    metoprolol succinate XL (Toprol-XL) 50 mg 24 hr tablet Take 1 tablet (50 mg) by mouth once daily at bedtime. 90 tablet 3    Microlet Lancet misc TEST once daily 100 each 3    pantoprazole (ProtoNix) 40 mg EC tablet Take 1 tablet (40 mg) by mouth once daily. 90 tablet 3    spironolactone (Aldactone) 25 mg tablet Take 0.5 tablets (12.5 mg) by mouth once daily. 45 tablet 3     No current facility-administered medications on file prior to visit.       Labs: (past 26 weeks)  Recent Results (from the past 26 weeks)   Lipid Panel    Collection Time: 02/19/25 10:53 AM   Result Value Ref Range    CHOLESTEROL, TOTAL 88 <200 mg/dL    HDL CHOLESTEROL 33 (L) >  OR = 40 mg/dL    TRIGLYCERIDES 117 <150 mg/dL    LDL-CHOLESTEROL 35 mg/dL (calc)    CHOL/HDLC RATIO 2.7 <5.0 (calc)    NON HDL CHOLESTEROL 55 <130 mg/dL (calc)   Magnesium    Collection Time: 02/19/25 10:53 AM   Result Value Ref Range    MAGNESIUM 1.9 1.5 - 2.5 mg/dL   Comprehensive Metabolic Panel    Collection Time: 02/19/25 10:53 AM   Result Value Ref Range    GLUCOSE 125 (H) 65 - 99 mg/dL    UREA NITROGEN (BUN) 24 7 - 25 mg/dL    CREATININE 1.20 0.70 - 1.22 mg/dL    EGFR 61 > OR = 60 mL/min/1.73m2    SODIUM 139 135 - 146 mmol/L    POTASSIUM 4.9 3.5 - 5.3 mmol/L    CHLORIDE 105 98 - 110 mmol/L    CARBON DIOXIDE 25 20 - 32 mmol/L    ELECTROLYTE BALANCE 9 7 - 17 mmol/L (calc)    CALCIUM 9.2 8.6 - 10.3 mg/dL    PROTEIN, TOTAL 6.8 6.1 - 8.1 g/dL    ALBUMIN 4.4 3.6 - 5.1 g/dL    BILIRUBIN, TOTAL 0.9 0.2 - 1.2 mg/dL    ALKALINE PHOSPHATASE 67 35 - 144 U/L    AST 19 10 - 35 U/L    ALT 17 9 - 46 U/L   CBC    Collection Time: 02/19/25 10:53 AM   Result Value Ref Range    WHITE BLOOD CELL COUNT 6.6 3.8 - 10.8 Thousand/uL    RED BLOOD CELL COUNT 4.53 4.20 - 5.80 Million/uL    HEMOGLOBIN 10.5 (L) 13.2 - 17.1 g/dL    HEMATOCRIT 35.1 (L) 38.5 - 50.0 %    MCV 77.5 (L) 80.0 - 100.0 fL    MCH 23.2 (L) 27.0 - 33.0 pg    MCHC 29.9 (L) 32.0 - 36.0 g/dL    RDW 17.1 (H) 11.0 - 15.0 %    PLATELET COUNT 227 140 - 400 Thousand/uL    MPV 10.9 7.5 - 12.5 fL       ECG  Encounter Date: 08/23/24   ECG 12 Lead    Narrative    Atrial sensed and ventricularly paced rhythm heart rate 81 bpm.       Echocardiogram  No results found for this or any previous visit from the past 1095 days.      CV Studies:  EKG:  Encounter Date: 08/23/24   ECG 12 Lead    Narrative    Atrial sensed and ventricularly paced rhythm heart rate 81 bpm.     Echocardiogram:   Echocardiogram     Narrative  [place] [id number]  Phone [phone] Fax [phone]    TRANSTHORACIC ECHOCARDIOGRAM REPORT      Patient Name:      TJ KENNEDY Reading Physician:   [id number] Jaswant Piedra DO  Study Date:       9/14/2023      Referring Physician: JASWANT PIEDRA  MRN/PID:          [id number]       PCP:  Accession/Order#: [id number]   Department Location: [facility]  YOB: 1945       Fellow:  Gender:           M              Nurse:               Paul Valerio  Admit Date:                      Sonographer:         Arabella Howe [facility]  Admission Status: Outpatient     Additional Staff:  Height:           177.80 cm      CC Report to:  Weight:           82.56 kg       Study Type:          Echocardiogram  BSA:              2.01 m2  Blood Pressure: 110 /70 mmHg    Diagnosis/ICD: I35.1-Nonrheumatic aortic (valve) insufficiency;  I25.10-Atherosclerotic heart disease of native coronary artery  without angina pectoris; I48.91-Unspecified atrial fibrillation;  I10-Essential (primary) hypertension; I50.42-Chronic combined  systolic (congestive) and diastolic (congestive) heart failure  (CHF); Z98.890-Other specified postprocedural states  Indication:    AFib, ASHD, CHF, MV Repair, Aortic Reguritation  Procedure/CPT: Echo Complete w Full [id number]    Patient History:  Valve Disorders:   Mitral Valve Repair.  Pertinent History: Murmur, A-Fib, CAD, HTN, CHF, CVA and Previous SVT.    Study Detail: The following Echo studies were performed: 2D, M-Mode, Doppler and  color flow. Technically challenging study due to prominent lung  artifact and body habitus. Optison used as a contrast agent for  endocardial border definition. Total contrast used for this  procedure was 4 mL via IV push.      PHYSICIAN INTERPRETATION:  Left Ventricle: Left ventricular systolic function is normal, with an estimated ejection fraction of 60%. There are no regional wall motion abnormalities. The left ventricular cavity size is normal. The left ventricular septal wall thickness is moderately increased. Left ventricular diastolic filling was indeterminate.  LV Wall  Scoring:  The apical septal segment and apical inferior segment are hypokinetic.    Left Atrium: The left atrium is moderately dilated.  Right Ventricle: The right ventricle is upper limits of normal in size. There is normal right ventricular global systolic function. A device is visualized in the right ventricle.  Right Atrium: The right atrium is normal in size. There is a device visualized in the right atrium.  Aortic Valve: The aortic valve is trileaflet. There is mild to moderate aortic valve regurgitation.  Mitral Valve: The mitral valve is mildly thickened. 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 There is mild mitral valve regurgitation. Mitral valve ring repair with fixed posterior mitral valve leaflet. Mild mitral valve regurgitation with a mean mitral valve gradient of 2.5 mmHg.  Tricuspid Valve: The tricuspid valve is structurally normal. There is trace tricuspid regurgitation.  Pulmonic Valve: The pulmonic valve is structurally normal. There is trace to mild pulmonic valve regurgitation.  Pericardium: There is no pericardial effusion noted.  Aorta: The aortic root is abnormal. There is mild dilatation of the ascending aorta. There is mild dilatation of the aortic root. Based of AD/height < 2.43 cm/m indicated lower risk of dissection.  Systemic Veins: The inferior vena cava size appears small.      CONCLUSIONS:  1. Left ventricular systolic function is normal with a 60% estimated ejection fraction.  2. Apical septal segment and apical inferior segment are abnormal.  3. Moderately increased left ventricular septal thickness.  4. The left atrium is moderately dilated.  5. Mitral valve ring repair with fixed posterior mitral valve leaflet. Mild mitral valve regurgitation with a mean mitral valve gradient of 2.5 mmHg.  6. Mild to moderate aortic valve regurgitation.    QUANTITATIVE DATA SUMMARY:  2D MEASUREMENTS:  Normal Ranges:  Ao Root d:     3.90 cm     (2.0-3.7cm)  LAs:           3.90 cm    (2.7-4.0cm)  IVSd:          1.40 cm    (0.6-1.1cm)  LVPWd:         1.10 cm    (0.6-1.1cm)  LVIDd:         4.70 cm    (3.9-5.9cm)  LVIDs:         2.90 cm  LV Mass Index: 112.1 g/m2  LV % FS        38.3 %    LA VOLUME:  Normal Ranges:  LA Vol A4C:        83.0 ml    (22+/-6mL/m2)  LA Vol A2C:        64.2 ml  LA Vol BP:         77.0 ml  LA Vol Index A4C:  41.4ml/m2  LA Vol Index A2C:  32.0 ml/m2  LA Vol Index BP:   38.4 ml/m2  LA Area A4C:       24.0 cm2  LA Area A2C:       20.0 cm2  LA Major Axis A4C: 5.9 cm  LA Major Axis A2C: 5.3 cm  LA Volume Index:   38.0 ml/m2    RA VOLUME BY A/L METHOD:  Normal Ranges:  RA Area A4C: 16.0 cm2    M-MODE MEASUREMENTS:  Normal Ranges:  Ao Root: 4.20 cm (2.0-3.7cm)  AoV Exc: 2.60 cm (1.5-2.5cm)  LAs:     4.60 cm (2.7-4.0cm)    AORTA MEASUREMENTS:  Normal Ranges:  AoV Exc:   2.60 cm (1.5-2.5cm)  AoV Sayra,s: 3.90 cm (1.4-2.6cm)  Asc Ao, d: 4.20 cm (2.1-3.4cm)  Ao Arch:   4.30 cm (2.0-3.6cm)    LV SYSTOLIC FUNCTION BY 2D PLANIMETRY (MOD):  Normal Ranges:  EF-A4C View: 66.5 % (>=55%)  EF-A2C View: 70.2 %  EF-Biplane:  68.2 %    LV DIASTOLIC FUNCTION:  Normal Ranges:  MV Peak E:    0.95 m/s    (0.7-1.2 m/s)  MV Peak A:    1.03 m/s    (0.42-0.7 m/s)  E/A Ratio:    0.93        (1.0-2.2)  MV e'         0.06 m/s    (>8.0)  MV lateral e' 0.05 m/s  MV medial e'  0.06 m/s  MV A Dur:     155.00 msec  E/e' Ratio:   17.33       (<8.0)  LV IVRT:      109 msec    (<100ms)    MITRAL VALVE:  Normal Ranges:  MV Vmax:    1.15 m/s (<=1.3m/s)  MV peak P.3 mmHg (<5mmHg)  MV mean P.5 mmHg (<48mmHg)  MV VTI:     39.60 cm (10-13cm)  MV DT:      289 msec (150-240msec)    AORTIC VALVE:  Normal Ranges:  LVOT [address]:  0.90 m/s (<=1.1m/s)  LVOT VTI:      21.00 cm  LVOT Diameter: 2.00 cm  (1.8-2.4cm)    AORTIC INSUFFICIENCY:  AI Vmax:      4.34 m/s  AI Half-time: 578 msec      RIGHT VENTRICLE:  RV Basal 3.70 cm  RV Mid   3.60 cm  RV Major 6.7 cm  TAPSE:   19.0 mm  RV s'     0.08 m/s    TRICUSPID VALVE/RVSP:  Normal Ranges:  Peak TR Velocity: 1.99 m/s  RV Syst Pressure: 18.8 mmHg (< 30mmHg)  TV E Vmax:        0.37 m/s  (0.3-0.7m/s)  TV A Vmax:        0.40 m/s  IVC Diam:         0.70 cm    PULMONIC VALVE:  Normal Ranges:  PV [address]: 1.3 m/s  (0.6-0.9m/s)  PV Max P.4 mmHg  PIEDV:      0.92 m/s  PADP:       6.4 mmHg      [id number] David Aranda DO  Electronically signed on 2023 at 10:57:50 AM      Wall Scoring         Final     Stress Testing IMGRESULT([id number]:): No results found for this or any previous visit from the past  days.    Cardiac Catheterization: No results found for this or any previous visit from the past  days.  No results found for this or any previous visit from the past 3650 days.     Cardiac Scoring: No results found for this or any previous visit from the past 1825 days.    AAA : No results found for this or any previous visit from the past 1825 days.    OTHER: No results found for this or any previous visit from the past 1825 days.    LAST IMAGING RESULTS  ECG 12 Lead  Atrial sensed and ventricularly paced rhythm heart rate 81 bpm.      Problem List Items Addressed This Visit       Typical atrial flutter (Multi)    ASHD (arteriosclerotic heart disease)    Benign essential HTN    Diabetes mellitus type II, non insulin dependent (Multi)    History of CVA (cerebrovascular accident)    History of mitral valve repair    Hyperlipidemia    A-fib (Multi) - Primary    On apixaban therapy    Nonrheumatic aortic valve insufficiency    NSVT (nonsustained ventricular tachycardia) (Multi)    (HFpEF) heart failure with preserved ejection fraction    Dizziness          David Aranda DO, FACC, FACOI

## 2025-02-27 ENCOUNTER — APPOINTMENT (OUTPATIENT)
Dept: PRIMARY CARE | Facility: CLINIC | Age: 80
End: 2025-02-27
Payer: MEDICARE

## 2025-02-27 VITALS
BODY MASS INDEX: 25.48 KG/M2 | HEIGHT: 70 IN | WEIGHT: 178 LBS | RESPIRATION RATE: 16 BRPM | HEART RATE: 81 BPM | DIASTOLIC BLOOD PRESSURE: 72 MMHG | OXYGEN SATURATION: 98 % | SYSTOLIC BLOOD PRESSURE: 118 MMHG

## 2025-02-27 DIAGNOSIS — Z00.00 ROUTINE GENERAL MEDICAL EXAMINATION AT HEALTH CARE FACILITY: ICD-10-CM

## 2025-02-27 DIAGNOSIS — R35.1 NOCTURIA ASSOCIATED WITH BENIGN PROSTATIC HYPERPLASIA: ICD-10-CM

## 2025-02-27 DIAGNOSIS — N18.31 STAGE 3A CHRONIC KIDNEY DISEASE (MULTI): ICD-10-CM

## 2025-02-27 DIAGNOSIS — I48.3 TYPICAL ATRIAL FLUTTER (MULTI): ICD-10-CM

## 2025-02-27 DIAGNOSIS — I10 BENIGN ESSENTIAL HTN: ICD-10-CM

## 2025-02-27 DIAGNOSIS — N40.1 NOCTURIA ASSOCIATED WITH BENIGN PROSTATIC HYPERPLASIA: ICD-10-CM

## 2025-02-27 DIAGNOSIS — I25.10 ASHD (ARTERIOSCLEROTIC HEART DISEASE): Primary | ICD-10-CM

## 2025-02-27 DIAGNOSIS — D50.0 IRON DEFICIENCY ANEMIA DUE TO CHRONIC BLOOD LOSS: ICD-10-CM

## 2025-02-27 DIAGNOSIS — E78.2 MIXED HYPERLIPIDEMIA: ICD-10-CM

## 2025-02-27 DIAGNOSIS — I50.42 CHRONIC COMBINED SYSTOLIC AND DIASTOLIC HEART FAILURE, NYHA CLASS 3: ICD-10-CM

## 2025-02-27 DIAGNOSIS — E11.9 DIET-CONTROLLED DIABETES MELLITUS (MULTI): ICD-10-CM

## 2025-02-27 LAB — POC HEMOGLOBIN A1C: 8.7 % (ref 4.2–6.5)

## 2025-02-27 PROCEDURE — 99214 OFFICE O/P EST MOD 30 MIN: CPT | Performed by: FAMILY MEDICINE

## 2025-02-27 PROCEDURE — 1159F MED LIST DOCD IN RCRD: CPT | Performed by: FAMILY MEDICINE

## 2025-02-27 PROCEDURE — 83036 HEMOGLOBIN GLYCOSYLATED A1C: CPT | Performed by: FAMILY MEDICINE

## 2025-02-27 PROCEDURE — 3078F DIAST BP <80 MM HG: CPT | Performed by: FAMILY MEDICINE

## 2025-02-27 PROCEDURE — 3074F SYST BP LT 130 MM HG: CPT | Performed by: FAMILY MEDICINE

## 2025-02-27 PROCEDURE — 1123F ACP DISCUSS/DSCN MKR DOCD: CPT | Performed by: FAMILY MEDICINE

## 2025-02-27 PROCEDURE — 1036F TOBACCO NON-USER: CPT | Performed by: FAMILY MEDICINE

## 2025-02-27 ASSESSMENT — ANXIETY QUESTIONNAIRES
1. FEELING NERVOUS, ANXIOUS, OR ON EDGE: NOT AT ALL
5. BEING SO RESTLESS THAT IT IS HARD TO SIT STILL: NOT AT ALL
4. TROUBLE RELAXING: NOT AT ALL
2. NOT BEING ABLE TO STOP OR CONTROL WORRYING: NOT AT ALL
IF YOU CHECKED OFF ANY PROBLEMS ON THIS QUESTIONNAIRE, HOW DIFFICULT HAVE THESE PROBLEMS MADE IT FOR YOU TO DO YOUR WORK, TAKE CARE OF THINGS AT HOME, OR GET ALONG WITH OTHER PEOPLE: NOT DIFFICULT AT ALL
GAD7 TOTAL SCORE: 0
7. FEELING AFRAID AS IF SOMETHING AWFUL MIGHT HAPPEN: NOT AT ALL
3. WORRYING TOO MUCH ABOUT DIFFERENT THINGS: NOT AT ALL
6. BECOMING EASILY ANNOYED OR IRRITABLE: NOT AT ALL

## 2025-02-27 ASSESSMENT — COLUMBIA-SUICIDE SEVERITY RATING SCALE - C-SSRS
2. HAVE YOU ACTUALLY HAD ANY THOUGHTS OF KILLING YOURSELF?: NO
6. HAVE YOU EVER DONE ANYTHING, STARTED TO DO ANYTHING, OR PREPARED TO DO ANYTHING TO END YOUR LIFE?: NO
1. IN THE PAST MONTH, HAVE YOU WISHED YOU WERE DEAD OR WISHED YOU COULD GO TO SLEEP AND NOT WAKE UP?: NO

## 2025-02-27 ASSESSMENT — ENCOUNTER SYMPTOMS
OCCASIONAL FEELINGS OF UNSTEADINESS: 0
LOSS OF SENSATION IN FEET: 0
LIGHT-HEADEDNESS: 1
DIZZINESS: 1
DEPRESSION: 0

## 2025-02-27 ASSESSMENT — PATIENT HEALTH QUESTIONNAIRE - PHQ9
1. LITTLE INTEREST OR PLEASURE IN DOING THINGS: NOT AT ALL
2. FEELING DOWN, DEPRESSED OR HOPELESS: NOT AT ALL
SUM OF ALL RESPONSES TO PHQ9 QUESTIONS 1 AND 2: 0

## 2025-02-27 NOTE — PROGRESS NOTES
Subjective   Patient ID: Braden Flores is a 79 y.o. male.    HPI  79 year old male here for six month follow up. He is doing well, has some instances of dizziness or off. Nnot associated with eating, mostly sitting, he fell once but does not know why. No injuries. Weight was up to 180 and is now down to 178. Log of BP in pm is quite low, he has not yet taken metoprolol when he gets.   Review of Systems   Neurological:  Positive for dizziness and light-headedness.   All other systems reviewed and are negative.      Objective   Physical Exam  Vitals reviewed.   Constitutional:       Appearance: Normal appearance.   HENT:      Head: Normocephalic and atraumatic.      Right Ear: Tympanic membrane normal.      Left Ear: Tympanic membrane normal.      Nose: Nose normal.      Mouth/Throat:      Mouth: Mucous membranes are moist.      Pharynx: Oropharynx is clear.   Eyes:      Extraocular Movements: Extraocular movements intact.      Conjunctiva/sclera: Conjunctivae normal.      Pupils: Pupils are equal, round, and reactive to light.   Cardiovascular:      Rate and Rhythm: Normal rate and regular rhythm.      Pulses: Normal pulses.      Heart sounds: Normal heart sounds.   Pulmonary:      Effort: Pulmonary effort is normal.      Breath sounds: Normal breath sounds.   Abdominal:      General: Abdomen is flat. Bowel sounds are normal.      Palpations: Abdomen is soft.   Musculoskeletal:         General: Normal range of motion.      Cervical back: Normal range of motion and neck supple.   Skin:     General: Skin is warm and dry.      Capillary Refill: Capillary refill takes less than 2 seconds.   Neurological:      General: No focal deficit present.      Mental Status: He is alert and oriented to person, place, and time.   Psychiatric:         Mood and Affect: Mood normal.         Behavior: Behavior normal.         Assessment/Plan   Diagnoses and all orders for this visit:  ASHD (arteriosclerotic heart disease)  Mixed  hyperlipidemia-Stable and controlled Dr. Aranda ordered lipid panel  -     Follow Up In [facility]  -     Hemoglobin A1C; Future  -     Prostate Specific Antigen; Future  -     CBC and Auto Differential; Future  -     Comprehensive Metabolic Panel; Future  -     Lipid Panel; Future  -     TSH with reflex to Free T4 if abnormal; Future  -     Albumin-Creatinine Ratio, Urine Random; Future  Diet-controlled diabetes mellitus (Multi)-discussed A1c which was very high at 8.7.  Braden brings in his daily sugars which are running between 80 and 110.  No sugars over 140, I am reluctant to add a medication at this time.  Advised to watch her sugars over the next 6 months and add if over 8 next visit  -     Follow Up In [facility]  -     POCT glycosylated hemoglobin (Hb A1C) manually resulted  -     Follow Up In [facility] - PCP - Medicare Annual; Future  -     Hemoglobin A1C; Future  -     Prostate Specific Antigen; Future  -     CBC and Auto Differential; Future  -     Comprehensive Metabolic Panel; Future  -     Lipid Panel; Future  -     TSH with reflex to Free T4 if abnormal; Future  -     Albumin-Creatinine Ratio, Urine Random; Future  Routine general medical examination at health care facility  -     [facility]  Chronic combined systolic and diastolic heart failure, NYHA class 3-Stable and controlled  Cardiology  -     Hemoglobin A1C; Future  -     Prostate Specific Antigen; Future  -     CBC and Auto Differential; Future  -     Comprehensive Metabolic Panel; Future  -     Lipid Panel; Future  -     TSH with reflex to Free T4 if abnormal; Future  -     Albumin-Creatinine Ratio, Urine Random; Future  Typical atrial flutter (Multi)-normal sinus rhythm today  Stage 3a chronic kidney disease (Multi)-stable and controlled  -     Follow Up In [facility] - PCP - Medicare Annual; Future  -      Hemoglobin A1C; Future  -     Prostate Specific Antigen; Future  -     CBC and Auto Differential; Future  -     Comprehensive Metabolic Panel; Future  -     Lipid Panel; Future  -     TSH with reflex to Free T4 if abnormal; Future  -     Albumin-Creatinine Ratio, Urine Random; Future  Benign essential HTN  Currently on lisinopril 5, metoprolol 50 and spironolactone 12.5.  Blood pressures are running low in the evening less than 100/60.  Would consider decreasing one of the morning medicines but he will discuss with Dr. Aranda at visit tomorrow  -     Hemoglobin A1C; Future  -     Prostate Specific Antigen; Future  -     CBC and Auto Differential; Future  -     Comprehensive Metabolic Panel; Future  -     Lipid Panel; Future  -     TSH with reflex to Free T4 if abnormal; Future  -     Albumin-Creatinine Ratio, Urine Random; Future  Iron deficiency anemia due to chronic blood loss-hemoglobin is dropped a point since the last lab work he has no complaints.  Normal bowels no blood in urine etc.  Plan to recheck 6 months and refer if positive consider fecal occult blood testing as well  -     Hemoglobin A1C; Future  -     Prostate Specific Antigen; Future  -     CBC and Auto Differential; Future  -     Comprehensive Metabolic Panel; Future  -     Lipid Panel; Future  -     TSH with reflex to Free T4 if abnormal; Future  -     Albumin-Creatinine Ratio, Urine Random; Future  Nocturia associated with benign prostatic hyperplasia  -     Prostate Specific Antigen; Future

## 2025-02-27 NOTE — PATIENT INSTRUCTIONS
Blood pressure has been a bit low in the evening, please ask Dr. Aranda about changing your medications    You do not need another pneumonia vaccine,m because you had the prevnar first.     Sugar is up higher than I would like- A1C is 8.7%- very tightly watch your sugars, recheck in 6 months and will need to add medication if  it remains above 8.     You are anemic with low iron, I would like you to start iron tablets daily, if it continues to drop with iron.

## 2025-02-28 ENCOUNTER — OFFICE VISIT (OUTPATIENT)
Dept: CARDIOLOGY | Facility: HOSPITAL | Age: 80
End: 2025-02-28
Payer: MEDICARE

## 2025-02-28 VITALS
SYSTOLIC BLOOD PRESSURE: 116 MMHG | HEART RATE: 67 BPM | DIASTOLIC BLOOD PRESSURE: 70 MMHG | WEIGHT: 179 LBS | BODY MASS INDEX: 25.62 KG/M2 | HEIGHT: 70 IN

## 2025-02-28 DIAGNOSIS — I35.1 NONRHEUMATIC AORTIC VALVE INSUFFICIENCY: ICD-10-CM

## 2025-02-28 DIAGNOSIS — Z79.01 ON APIXABAN THERAPY: ICD-10-CM

## 2025-02-28 DIAGNOSIS — I47.29 NSVT (NONSUSTAINED VENTRICULAR TACHYCARDIA) (MULTI): ICD-10-CM

## 2025-02-28 DIAGNOSIS — I50.32 CHRONIC HEART FAILURE WITH PRESERVED EJECTION FRACTION: ICD-10-CM

## 2025-02-28 DIAGNOSIS — I48.91 ATRIAL FIBRILLATION, UNSPECIFIED TYPE (MULTI): Primary | ICD-10-CM

## 2025-02-28 DIAGNOSIS — I48.3 TYPICAL ATRIAL FLUTTER (MULTI): ICD-10-CM

## 2025-02-28 DIAGNOSIS — I25.10 ASHD (ARTERIOSCLEROTIC HEART DISEASE): ICD-10-CM

## 2025-02-28 DIAGNOSIS — E11.9 DIABETES MELLITUS TYPE II, NON INSULIN DEPENDENT (MULTI): ICD-10-CM

## 2025-02-28 DIAGNOSIS — Z86.73 HISTORY OF CVA (CEREBROVASCULAR ACCIDENT): ICD-10-CM

## 2025-02-28 DIAGNOSIS — R42 DIZZINESS: ICD-10-CM

## 2025-02-28 DIAGNOSIS — E78.2 MIXED HYPERLIPIDEMIA: ICD-10-CM

## 2025-02-28 DIAGNOSIS — Z98.890 HISTORY OF MITRAL VALVE REPAIR: ICD-10-CM

## 2025-02-28 DIAGNOSIS — I10 BENIGN ESSENTIAL HTN: ICD-10-CM

## 2025-02-28 LAB
ATRIAL RATE: 67 BPM
P OFFSET: 147 MS
P ONSET: 90 MS
PR INTERVAL: 200 MS
Q ONSET: 190 MS
QRS COUNT: 11 BEATS
QRS DURATION: 168 MS
QT INTERVAL: 450 MS
QTC CALCULATION(BAZETT): 475 MS
QTC FREDERICIA: 467 MS
R AXIS: -74 DEGREES
T AXIS: 82 DEGREES
T OFFSET: 415 MS
VENTRICULAR RATE: 67 BPM

## 2025-02-28 PROCEDURE — 3074F SYST BP LT 130 MM HG: CPT | Performed by: INTERNAL MEDICINE

## 2025-02-28 PROCEDURE — 1159F MED LIST DOCD IN RCRD: CPT | Performed by: INTERNAL MEDICINE

## 2025-02-28 PROCEDURE — 93010 ELECTROCARDIOGRAM REPORT: CPT | Performed by: INTERNAL MEDICINE

## 2025-02-28 PROCEDURE — 1123F ACP DISCUSS/DSCN MKR DOCD: CPT | Performed by: INTERNAL MEDICINE

## 2025-02-28 PROCEDURE — 99214 OFFICE O/P EST MOD 30 MIN: CPT | Performed by: INTERNAL MEDICINE

## 2025-02-28 PROCEDURE — 93005 ELECTROCARDIOGRAM TRACING: CPT | Performed by: INTERNAL MEDICINE

## 2025-02-28 PROCEDURE — 1036F TOBACCO NON-USER: CPT | Performed by: INTERNAL MEDICINE

## 2025-02-28 PROCEDURE — 99214 OFFICE O/P EST MOD 30 MIN: CPT | Mod: 25 | Performed by: INTERNAL MEDICINE

## 2025-02-28 PROCEDURE — 3078F DIAST BP <80 MM HG: CPT | Performed by: INTERNAL MEDICINE

## 2025-03-17 ENCOUNTER — HOSPITAL ENCOUNTER (OUTPATIENT)
Dept: CARDIOLOGY | Facility: HOSPITAL | Age: 80
Discharge: HOME | End: 2025-03-17
Payer: MEDICARE

## 2025-03-17 DIAGNOSIS — I10 BENIGN ESSENTIAL HTN: ICD-10-CM

## 2025-03-17 DIAGNOSIS — E11.9 DIABETES MELLITUS TYPE II, NON INSULIN DEPENDENT (MULTI): ICD-10-CM

## 2025-03-17 DIAGNOSIS — Z86.73 HISTORY OF CVA (CEREBROVASCULAR ACCIDENT): ICD-10-CM

## 2025-03-17 DIAGNOSIS — I25.10 ASHD (ARTERIOSCLEROTIC HEART DISEASE): ICD-10-CM

## 2025-03-17 DIAGNOSIS — I48.91 ATRIAL FIBRILLATION, UNSPECIFIED TYPE (MULTI): ICD-10-CM

## 2025-03-17 DIAGNOSIS — I47.29 NSVT (NONSUSTAINED VENTRICULAR TACHYCARDIA) (MULTI): ICD-10-CM

## 2025-03-17 DIAGNOSIS — E78.2 MIXED HYPERLIPIDEMIA: ICD-10-CM

## 2025-03-17 DIAGNOSIS — I48.3 TYPICAL ATRIAL FLUTTER (MULTI): ICD-10-CM

## 2025-03-17 DIAGNOSIS — Z98.890 HISTORY OF MITRAL VALVE REPAIR: ICD-10-CM

## 2025-03-17 DIAGNOSIS — I50.32 CHRONIC HEART FAILURE WITH PRESERVED EJECTION FRACTION: ICD-10-CM

## 2025-03-17 DIAGNOSIS — Z79.01 ON APIXABAN THERAPY: ICD-10-CM

## 2025-03-17 DIAGNOSIS — R42 DIZZINESS: ICD-10-CM

## 2025-03-17 DIAGNOSIS — I35.1 NONRHEUMATIC AORTIC VALVE INSUFFICIENCY: ICD-10-CM

## 2025-03-17 PROCEDURE — 93356 MYOCRD STRAIN IMG SPCKL TRCK: CPT | Performed by: INTERNAL MEDICINE

## 2025-03-17 PROCEDURE — 93306 TTE W/DOPPLER COMPLETE: CPT | Performed by: INTERNAL MEDICINE

## 2025-03-17 PROCEDURE — 93356 MYOCRD STRAIN IMG SPCKL TRCK: CPT

## 2025-03-18 LAB
AORTIC VALVE MEAN GRADIENT: 4 MMHG
AORTIC VALVE PEAK VELOCITY: 1.38 M/S
AV PEAK GRADIENT: 8 MMHG
AVA (PEAK VEL): 2.12 CM2
AVA (VTI): 2.06 CM2
EJECTION FRACTION APICAL 4 CHAMBER: 42.5
EJECTION FRACTION: 53 %
GLOBAL LONGITUDINAL STRAIN: 14.9 %
LEFT ATRIUM VOLUME AREA LENGTH INDEX BSA: 27.5 ML/M2
LEFT VENTRICLE INTERNAL DIMENSION DIASTOLE: 4.57 CM (ref 3.5–6)
LEFT VENTRICULAR OUTFLOW TRACT DIAMETER: 2.08 CM
LV EJECTION FRACTION BIPLANE: 48 %
MITRAL VALVE E/A RATIO: 0.89
RIGHT VENTRICLE FREE WALL PEAK S': 10.04 CM/S
RIGHT VENTRICLE PEAK SYSTOLIC PRESSURE: 15.1 MMHG
TRICUSPID ANNULAR PLANE SYSTOLIC EXCURSION: 2.4 CM

## 2025-03-19 ENCOUNTER — TELEPHONE (OUTPATIENT)
Dept: CARDIOLOGY | Facility: HOSPITAL | Age: 80
End: 2025-03-19
Payer: MEDICARE

## 2025-03-19 NOTE — TELEPHONE ENCOUNTER
3/19/25  1305  Called echocardiogram results to patient with patient verbalizing understanding.    3/19/25  1031  Called patient; no answer. Left voice message for patient to return call for echocardiogram results.        ----- Message from David Aranda sent at 3/18/2025 12:46 PM EDT -----  Echocardiogram shows low normal left ventricular systolic function with an ejection fraction of 50 to 55%, normal right ventricular systolic function, history of mitral valve repair with a mean mitral valve gradient of 1.9 mmHg, mild aortic valve regurgitation.

## 2025-04-16 ENCOUNTER — HOSPITAL ENCOUNTER (OUTPATIENT)
Dept: CARDIOLOGY | Facility: HOSPITAL | Age: 80
Discharge: HOME | End: 2025-04-16
Payer: MEDICARE

## 2025-04-16 DIAGNOSIS — I44.2 COMPLETE HEART BLOCK: Primary | ICD-10-CM

## 2025-04-16 DIAGNOSIS — I44.2 COMPLETE HEART BLOCK: ICD-10-CM

## 2025-04-16 PROCEDURE — 93280 PM DEVICE PROGR EVAL DUAL: CPT

## 2025-07-17 ENCOUNTER — HOSPITAL ENCOUNTER (OUTPATIENT)
Dept: CARDIOLOGY | Facility: HOSPITAL | Age: 80
Discharge: HOME | End: 2025-07-17
Payer: MEDICARE

## 2025-07-17 DIAGNOSIS — I44.2 COMPLETE HEART BLOCK: ICD-10-CM

## 2025-07-17 PROCEDURE — 93296 REM INTERROG EVL PM/IDS: CPT

## 2025-08-14 PROBLEM — I95.1 ORTHOSTASIS: Status: ACTIVE | Noted: 2025-08-14

## 2025-08-17 DIAGNOSIS — E11.9 DIABETES MELLITUS TYPE II, NON INSULIN DEPENDENT (MULTI): ICD-10-CM

## 2025-08-17 DIAGNOSIS — I48.91 ATRIAL FIBRILLATION, UNSPECIFIED TYPE (MULTI): ICD-10-CM

## 2025-08-17 DIAGNOSIS — Z98.890 HISTORY OF MITRAL VALVE REPAIR: ICD-10-CM

## 2025-08-17 DIAGNOSIS — Z79.01 ON APIXABAN THERAPY: ICD-10-CM

## 2025-08-17 DIAGNOSIS — Z86.73 HISTORY OF CVA (CEREBROVASCULAR ACCIDENT): ICD-10-CM

## 2025-08-17 DIAGNOSIS — R42 DIZZINESS: ICD-10-CM

## 2025-08-17 DIAGNOSIS — I35.1 NONRHEUMATIC AORTIC VALVE INSUFFICIENCY: ICD-10-CM

## 2025-08-17 DIAGNOSIS — I47.29 NSVT (NONSUSTAINED VENTRICULAR TACHYCARDIA) (MULTI): ICD-10-CM

## 2025-08-17 DIAGNOSIS — I10 BENIGN ESSENTIAL HTN: ICD-10-CM

## 2025-08-17 DIAGNOSIS — I25.10 ASHD (ARTERIOSCLEROTIC HEART DISEASE): ICD-10-CM

## 2025-08-17 DIAGNOSIS — I48.3 TYPICAL ATRIAL FLUTTER (MULTI): ICD-10-CM

## 2025-08-17 DIAGNOSIS — I50.32 CHRONIC HEART FAILURE WITH PRESERVED EJECTION FRACTION: ICD-10-CM

## 2025-08-17 DIAGNOSIS — E78.2 MIXED HYPERLIPIDEMIA: ICD-10-CM

## 2025-08-19 LAB
ANION GAP SERPL CALCULATED.4IONS-SCNC: 8 MMOL/L (CALC) (ref 7–17)
BNP SERPL-MCNC: 90 PG/ML
BUN SERPL-MCNC: 24 MG/DL (ref 7–25)
BUN/CREAT SERPL: ABNORMAL (CALC) (ref 6–22)
CALCIUM SERPL-MCNC: 9.1 MG/DL (ref 8.6–10.3)
CHLORIDE SERPL-SCNC: 104 MMOL/L (ref 98–110)
CO2 SERPL-SCNC: 27 MMOL/L (ref 20–32)
CREAT SERPL-MCNC: 1.21 MG/DL (ref 0.7–1.22)
EGFRCR SERPLBLD CKD-EPI 2021: 60 ML/MIN/1.73M2
ERYTHROCYTE [DISTWIDTH] IN BLOOD BY AUTOMATED COUNT: 16 % (ref 11–15)
GLUCOSE SERPL-MCNC: 109 MG/DL (ref 65–99)
HCT VFR BLD AUTO: 42.6 % (ref 38.5–50)
HGB BLD-MCNC: 13.3 G/DL (ref 13.2–17.1)
MAGNESIUM SERPL-MCNC: 1.7 MG/DL (ref 1.5–2.5)
MCH RBC QN AUTO: 29.1 PG (ref 27–33)
MCHC RBC AUTO-ENTMCNC: 31.2 G/DL (ref 32–36)
MCV RBC AUTO: 93.2 FL (ref 80–100)
PLATELET # BLD AUTO: 148 THOUSAND/UL (ref 140–400)
PMV BLD REES-ECKER: 10.1 FL (ref 7.5–12.5)
POTASSIUM SERPL-SCNC: 4.8 MMOL/L (ref 3.5–5.3)
RBC # BLD AUTO: 4.57 MILLION/UL (ref 4.2–5.8)
SODIUM SERPL-SCNC: 139 MMOL/L (ref 135–146)
WBC # BLD AUTO: 6.7 THOUSAND/UL (ref 3.8–10.8)

## 2025-08-27 DIAGNOSIS — R35.1 NOCTURIA ASSOCIATED WITH BENIGN PROSTATIC HYPERPLASIA: ICD-10-CM

## 2025-08-27 DIAGNOSIS — I50.42 CHRONIC COMBINED SYSTOLIC AND DIASTOLIC HEART FAILURE, NYHA CLASS 3: ICD-10-CM

## 2025-08-27 DIAGNOSIS — E78.2 MIXED HYPERLIPIDEMIA: ICD-10-CM

## 2025-08-27 DIAGNOSIS — N18.31 STAGE 3A CHRONIC KIDNEY DISEASE (MULTI): ICD-10-CM

## 2025-08-27 DIAGNOSIS — D50.0 IRON DEFICIENCY ANEMIA DUE TO CHRONIC BLOOD LOSS: ICD-10-CM

## 2025-08-27 DIAGNOSIS — E11.9 DIET-CONTROLLED DIABETES MELLITUS (MULTI): ICD-10-CM

## 2025-08-27 DIAGNOSIS — N40.1 NOCTURIA ASSOCIATED WITH BENIGN PROSTATIC HYPERPLASIA: ICD-10-CM

## 2025-08-27 DIAGNOSIS — I10 BENIGN ESSENTIAL HTN: ICD-10-CM

## 2025-08-28 LAB
ALBUMIN SERPL-MCNC: 4.4 G/DL (ref 3.6–5.1)
ALBUMIN/CREAT UR: 11 MG/G CREAT
ALP SERPL-CCNC: 67 U/L (ref 35–144)
ALT SERPL-CCNC: 19 U/L (ref 9–46)
ANION GAP SERPL CALCULATED.4IONS-SCNC: 6 MMOL/L (CALC) (ref 7–17)
AST SERPL-CCNC: 19 U/L (ref 10–35)
BASOPHILS # BLD AUTO: 20 CELLS/UL (ref 0–200)
BASOPHILS NFR BLD AUTO: 0.3 %
BILIRUB SERPL-MCNC: 1.2 MG/DL (ref 0.2–1.2)
BUN SERPL-MCNC: 26 MG/DL (ref 7–25)
CALCIUM SERPL-MCNC: 9.5 MG/DL (ref 8.6–10.3)
CHLORIDE SERPL-SCNC: 103 MMOL/L (ref 98–110)
CHOLEST SERPL-MCNC: 93 MG/DL
CHOLEST/HDLC SERPL: 2.4 (CALC)
CO2 SERPL-SCNC: 30 MMOL/L (ref 20–32)
CREAT SERPL-MCNC: 1.35 MG/DL (ref 0.7–1.22)
CREAT UR-MCNC: 84 MG/DL (ref 20–320)
EGFRCR SERPLBLD CKD-EPI 2021: 52 ML/MIN/1.73M2
EOSINOPHIL # BLD AUTO: 99 CELLS/UL (ref 15–500)
EOSINOPHIL NFR BLD AUTO: 1.5 %
ERYTHROCYTE [DISTWIDTH] IN BLOOD BY AUTOMATED COUNT: 15.7 % (ref 11–15)
EST. AVERAGE GLUCOSE BLD GHB EST-MCNC: 160 MG/DL
EST. AVERAGE GLUCOSE BLD GHB EST-SCNC: 8.9 MMOL/L
GLUCOSE SERPL-MCNC: 125 MG/DL (ref 65–99)
HBA1C MFR BLD: 7.2 %
HCT VFR BLD AUTO: 42.9 % (ref 38.5–50)
HDLC SERPL-MCNC: 39 MG/DL
HGB BLD-MCNC: 13.7 G/DL (ref 13.2–17.1)
LDLC SERPL CALC-MCNC: 38 MG/DL (CALC)
LYMPHOCYTES # BLD AUTO: 1181 CELLS/UL (ref 850–3900)
LYMPHOCYTES NFR BLD AUTO: 17.9 %
MCH RBC QN AUTO: 29.8 PG (ref 27–33)
MCHC RBC AUTO-ENTMCNC: 31.9 G/DL (ref 32–36)
MCV RBC AUTO: 93.3 FL (ref 80–100)
MICROALBUMIN UR-MCNC: 0.9 MG/DL
MONOCYTES # BLD AUTO: 700 CELLS/UL (ref 200–950)
MONOCYTES NFR BLD AUTO: 10.6 %
NEUTROPHILS # BLD AUTO: 4600 CELLS/UL (ref 1500–7800)
NEUTROPHILS NFR BLD AUTO: 69.7 %
NONHDLC SERPL-MCNC: 54 MG/DL (CALC)
PLATELET # BLD AUTO: 159 THOUSAND/UL (ref 140–400)
PMV BLD REES-ECKER: 10.3 FL (ref 7.5–12.5)
POTASSIUM SERPL-SCNC: 5.1 MMOL/L (ref 3.5–5.3)
PROT SERPL-MCNC: 6.8 G/DL (ref 6.1–8.1)
PSA SERPL-MCNC: 3.69 NG/ML
RBC # BLD AUTO: 4.6 MILLION/UL (ref 4.2–5.8)
SODIUM SERPL-SCNC: 139 MMOL/L (ref 135–146)
TRIGL SERPL-MCNC: 81 MG/DL
TSH SERPL-ACNC: 1.32 MIU/L (ref 0.4–4.5)
WBC # BLD AUTO: 6.6 THOUSAND/UL (ref 3.8–10.8)

## 2025-08-29 ENCOUNTER — OFFICE VISIT (OUTPATIENT)
Dept: CARDIOLOGY | Facility: HOSPITAL | Age: 80
End: 2025-08-29
Payer: MEDICARE

## 2025-08-29 VITALS
BODY MASS INDEX: 25.34 KG/M2 | HEART RATE: 77 BPM | SYSTOLIC BLOOD PRESSURE: 116 MMHG | DIASTOLIC BLOOD PRESSURE: 74 MMHG | HEIGHT: 70 IN | WEIGHT: 177 LBS

## 2025-08-29 DIAGNOSIS — I48.3 TYPICAL ATRIAL FLUTTER (MULTI): ICD-10-CM

## 2025-08-29 DIAGNOSIS — I95.1 ORTHOSTASIS: ICD-10-CM

## 2025-08-29 DIAGNOSIS — I35.1 NONRHEUMATIC AORTIC VALVE INSUFFICIENCY: ICD-10-CM

## 2025-08-29 DIAGNOSIS — I47.29 NSVT (NONSUSTAINED VENTRICULAR TACHYCARDIA) (MULTI): ICD-10-CM

## 2025-08-29 DIAGNOSIS — Z79.01 ON APIXABAN THERAPY: ICD-10-CM

## 2025-08-29 DIAGNOSIS — I25.10 ASHD (ARTERIOSCLEROTIC HEART DISEASE): ICD-10-CM

## 2025-08-29 DIAGNOSIS — I50.42 CHRONIC COMBINED SYSTOLIC AND DIASTOLIC HEART FAILURE, NYHA CLASS 3: ICD-10-CM

## 2025-08-29 DIAGNOSIS — Z98.890 HISTORY OF MITRAL VALVE REPAIR: ICD-10-CM

## 2025-08-29 DIAGNOSIS — I48.91 ATRIAL FIBRILLATION, UNSPECIFIED TYPE (MULTI): Primary | ICD-10-CM

## 2025-08-29 DIAGNOSIS — I50.32 CHRONIC HEART FAILURE WITH PRESERVED EJECTION FRACTION: ICD-10-CM

## 2025-08-29 DIAGNOSIS — Z86.73 HISTORY OF CVA (CEREBROVASCULAR ACCIDENT): ICD-10-CM

## 2025-08-29 DIAGNOSIS — I10 BENIGN ESSENTIAL HTN: ICD-10-CM

## 2025-08-29 DIAGNOSIS — E78.5 DYSLIPIDEMIA: ICD-10-CM

## 2025-08-29 DIAGNOSIS — E11.9 DIABETES MELLITUS TYPE II, NON INSULIN DEPENDENT (MULTI): ICD-10-CM

## 2025-08-29 LAB
ATRIAL RATE: 77 BPM
P AXIS: 31 DEGREES
P OFFSET: 134 MS
P ONSET: 90 MS
PR INTERVAL: 206 MS
Q ONSET: 193 MS
QRS COUNT: 13 BEATS
QRS DURATION: 164 MS
QT INTERVAL: 428 MS
QTC CALCULATION(BAZETT): 484 MS
QTC FREDERICIA: 465 MS
R AXIS: 228 DEGREES
T AXIS: 12 DEGREES
T OFFSET: 407 MS
VENTRICULAR RATE: 77 BPM

## 2025-08-29 PROCEDURE — 99212 OFFICE O/P EST SF 10 MIN: CPT | Performed by: INTERNAL MEDICINE

## 2025-08-29 PROCEDURE — 3078F DIAST BP <80 MM HG: CPT | Performed by: INTERNAL MEDICINE

## 2025-08-29 PROCEDURE — 1159F MED LIST DOCD IN RCRD: CPT | Performed by: INTERNAL MEDICINE

## 2025-08-29 PROCEDURE — 99214 OFFICE O/P EST MOD 30 MIN: CPT | Performed by: INTERNAL MEDICINE

## 2025-08-29 PROCEDURE — 1036F TOBACCO NON-USER: CPT | Performed by: INTERNAL MEDICINE

## 2025-08-29 PROCEDURE — 3074F SYST BP LT 130 MM HG: CPT | Performed by: INTERNAL MEDICINE

## 2025-08-29 PROCEDURE — 93005 ELECTROCARDIOGRAM TRACING: CPT | Performed by: INTERNAL MEDICINE

## 2025-08-29 RX ORDER — CLOPIDOGREL BISULFATE 75 MG/1
75 TABLET ORAL DAILY
Qty: 90 TABLET | Refills: 3 | Status: SHIPPED | OUTPATIENT
Start: 2025-08-29 | End: 2026-08-29

## 2025-08-29 RX ORDER — METOPROLOL SUCCINATE 50 MG/1
50 TABLET, EXTENDED RELEASE ORAL NIGHTLY
Qty: 90 TABLET | Refills: 3 | Status: SHIPPED | OUTPATIENT
Start: 2025-08-29

## 2025-08-29 RX ORDER — LISINOPRIL 5 MG/1
5 TABLET ORAL DAILY
Qty: 90 TABLET | Refills: 3 | Status: SHIPPED | OUTPATIENT
Start: 2025-08-29 | End: 2026-08-29

## 2025-09-08 ENCOUNTER — APPOINTMENT (OUTPATIENT)
Dept: PRIMARY CARE | Facility: CLINIC | Age: 80
End: 2025-09-08
Payer: MEDICARE